# Patient Record
Sex: FEMALE | Race: WHITE
[De-identification: names, ages, dates, MRNs, and addresses within clinical notes are randomized per-mention and may not be internally consistent; named-entity substitution may affect disease eponyms.]

---

## 2018-03-11 ENCOUNTER — HOSPITAL ENCOUNTER (INPATIENT)
Dept: HOSPITAL 74 - JER | Age: 53
LOS: 7 days | Discharge: HOME | DRG: 189 | End: 2018-03-18
Attending: INTERNAL MEDICINE | Admitting: INTERNAL MEDICINE
Payer: COMMERCIAL

## 2018-03-11 VITALS — BODY MASS INDEX: 28 KG/M2

## 2018-03-11 DIAGNOSIS — J98.11: ICD-10-CM

## 2018-03-11 DIAGNOSIS — J18.9: ICD-10-CM

## 2018-03-11 DIAGNOSIS — K59.00: ICD-10-CM

## 2018-03-11 DIAGNOSIS — J96.01: Primary | ICD-10-CM

## 2018-03-11 DIAGNOSIS — J06.9: ICD-10-CM

## 2018-03-11 DIAGNOSIS — J45.901: ICD-10-CM

## 2018-03-11 LAB
ALBUMIN SERPL-MCNC: 3.7 G/DL (ref 3.4–5)
ALP SERPL-CCNC: 76 U/L (ref 45–117)
ALT SERPL-CCNC: 18 U/L (ref 12–78)
ANION GAP SERPL CALC-SCNC: 8 MMOL/L (ref 8–16)
AST SERPL-CCNC: 17 U/L (ref 15–37)
BASOPHILS # BLD: 0.5 % (ref 0–2)
BILIRUB SERPL-MCNC: 0.6 MG/DL (ref 0.2–1)
BUN SERPL-MCNC: 12 MG/DL (ref 7–18)
CALCIUM SERPL-MCNC: 8.7 MG/DL (ref 8.5–10.1)
CHLORIDE SERPL-SCNC: 107 MMOL/L (ref 98–107)
CO2 SERPL-SCNC: 29 MMOL/L (ref 21–32)
CREAT SERPL-MCNC: 0.9 MG/DL (ref 0.55–1.02)
DEPRECATED RDW RBC AUTO: 12.7 % (ref 11.6–15.6)
EOSINOPHIL # BLD: 3.9 % (ref 0–4.5)
GLUCOSE SERPL-MCNC: 64 MG/DL (ref 74–106)
HCT VFR BLD CALC: 41.9 % (ref 32.4–45.2)
HGB BLD-MCNC: 14 GM/DL (ref 10.7–15.3)
LYMPHOCYTES # BLD: 21.4 % (ref 8–40)
MCH RBC QN AUTO: 30.5 PG (ref 25.7–33.7)
MCHC RBC AUTO-ENTMCNC: 33.5 G/DL (ref 32–36)
MCV RBC: 91.2 FL (ref 80–96)
MONOCYTES # BLD AUTO: 6.8 % (ref 3.8–10.2)
NEUTROPHILS # BLD: 67.4 % (ref 42.8–82.8)
PH BLDV: 7.32 [PH] (ref 7.32–7.42)
PLATELET # BLD AUTO: 206 K/MM3 (ref 134–434)
PMV BLD: 9.5 FL (ref 7.5–11.1)
POTASSIUM SERPLBLD-SCNC: 3.7 MMOL/L (ref 3.5–5.1)
PROT SERPL-MCNC: 7 G/DL (ref 6.4–8.2)
RBC # BLD AUTO: 4.59 M/MM3 (ref 3.6–5.2)
SODIUM SERPL-SCNC: 144 MMOL/L (ref 136–145)
VENOUS PC02: 41.5 MMHG (ref 38–52)
VENOUS PO2: 48 MMHG (ref 28–48)
WBC # BLD AUTO: 10.2 K/MM3 (ref 4–10)

## 2018-03-11 NOTE — PDOC
History of Present Illness





<SpauldingElvia - Last Filed: 03/12/18 00:35>





- General


History Source: Patient





- History of Present Illness


Initial Comments: 





03/11/18 23:02


52 y.o. female with a PMH of asthma (no prior hospitalizations/intubations) 

presents to our ED c/o acute onset of dyspnea.  At presentation patient SpO2 80'

s @ presetation .  Patient's  @ bedside notes patient has c/o 2 days of 

myalgias, weakness, sore throat. Recent travel to [place]. Patient used her 

inhaler a few times this weekend and had one at home breathing treatment this 

evening @ 1800 prior to presentation.  Patient works as a  and 

many of the students have been treated for Influenza/influenza like symptoms.  





Patient denies any chest pain, abdominal pain, nausea/vomiting, diarrhea/

constipation.  





NKDA


Surgical: denies


Social: denies nicotine, social alcohol 1-2 drinks/monthly, denies recreational 

drugs


PMD: none;  Internist: Juanjose














<Cheryl Card - Last Filed: 03/16/18 15:54>





- General


Chief Complaint: Wheezing


Stated Complaint: SHORTNESS OF BREATH


Time Seen by Provider: 03/11/18 22:40





Past History





<ChellyElvia - Last Filed: 03/12/18 00:35>





- Past Medical History


Asthma: Yes


COPD: No





- Suicide/Smoking/Psychosocial Hx


Smoking History: Never smoked


Have you smoked in the past 12 months: No


Information on smoking cessation initiated: No


Hx Alcohol Use: No


Drug/Substance Use Hx: No


Substance Use Type: None





<Cheryl Card - Last Filed: 03/16/18 15:54>





- Past Medical History


Allergies/Adverse Reactions: 


 Allergies











Allergy/AdvReac Type Severity Reaction Status Date / Time


 


No Known Allergies Allergy   Verified 03/11/18 22:34











Home Medications: 


Ambulatory Orders





NK [No Known Home Medication]  03/11/18 











**Review of Systems





- Review of Systems


Constitutional: Yes: Malaise.  No: Chills, Fever


HEENTM: Yes: Throat Pain.  No: Recent change in vision


Respiratory: Yes: Shortness of Breath, Wheezing


Cardiac (ROS): No: Chest Pain, Lightheadedness, Palpitations, Syncope


ABD/GI: No: Constipated, Diarrhea, Nausea, Vomiting


: No: Burning, Dysuria


Neurological: No: Headache, Numbness





<Cheryl Card - Last Filed: 03/16/18 15:54>





*Physical Exam





- Vital Signs


 Last Vital Signs











Temp Pulse Resp BP Pulse Ox


 


 98.7 F   113 H  26 H  113/61   100 


 


 03/11/18 22:36  03/11/18 22:36  03/11/18 22:36  03/11/18 22:36  03/11/18 23:05














<Elvia Spaulding - Last Filed: 03/12/18 00:35>





- Vital Signs


 Last Vital Signs











Temp Pulse Resp BP Pulse Ox


 


 98.7 F   113 H  26 H  113/61   86 L


 


 03/11/18 22:36  03/11/18 22:36  03/11/18 22:36  03/11/18 22:36  03/11/18 22:36














- Physical Exam


General Appearance: Yes: Nourished, Appropriately Dressed


HEENT: positive: EOMI, SOHA


Neck: positive: Trachea midline, Supple


Respiratory/Chest: positive: Labored Respiration, Rapid RR, Wheezing.  negative

: Crackles, Rales


Cardiovascular: positive: S1, S2.  negative: Edema, JVD, Murmur


Gastrointestinal/Abdominal: positive: Normal Bowel Sounds, Soft


Musculoskeletal: negative: CVA Tenderness (R), CVA Tenderness (L)


Integumentary: positive: Normal Color, Dry, Warm


Neurologic: positive: Fully Oriented, Alert





<Cheryl Card - Last Filed: 03/16/18 15:54>





ED Treatment Course





- LABORATORY


CBC & Chemistry Diagram: 


 03/11/18 22:45





 03/11/18 22:45





- ADDITIONAL ORDERS


Additional order review: 


 Laboratory  Results











  03/11/18 03/11/18 03/11/18





  23:11 23:08 22:45


 


D-Dimer   1345 H 


 


VBG pH  7.32  


 


POC VBG pCO2  41.5  


 


POC VBG pO2  48.0  


 


Mixed VBG HCO3  21.0  


 


Sodium   


 


Potassium   


 


Chloride   


 


Carbon Dioxide   


 


Anion Gap   


 


BUN   


 


Creatinine   


 


Creat Clearance w eGFR   


 


Random Glucose   


 


Calcium   


 


Total Bilirubin   


 


AST   


 


ALT   


 


Alkaline Phosphatase   


 


Creatine Kinase    74


 


Troponin I    < 0.02


 


Total Protein   


 


Albumin   














  03/11/18





  22:45


 


D-Dimer 


 


VBG pH 


 


POC VBG pCO2 


 


POC VBG pO2 


 


Mixed VBG HCO3 


 


Sodium  144


 


Potassium  3.7


 


Chloride  107


 


Carbon Dioxide  29


 


Anion Gap  8


 


BUN  12


 


Creatinine  0.9


 


Creat Clearance w eGFR  > 60


 


Random Glucose  64 L


 


Calcium  8.7


 


Total Bilirubin  0.6


 


AST  17


 


ALT  18


 


Alkaline Phosphatase  76


 


Creatine Kinase 


 


Troponin I 


 


Total Protein  7.0


 


Albumin  3.7








 











  03/11/18





  22:45


 


RBC  4.59


 


MCV  91.2


 


MCHC  33.5


 


RDW  12.7


 


MPV  9.5


 


Neutrophils %  67.4


 


Lymphocytes %  21.4


 


Monocytes %  6.8


 


Eosinophils %  3.9


 


Basophils %  0.5














- Medications


Given in the ED: 


ED Medications














Discontinued Medications














Generic Name Dose Route Start Last Admin





  Trade Name Edmondq  PRN Reason Stop Dose Admin


 


Albuterol/Ipratropium  1 amp  03/11/18 22:45  03/11/18 23:17





  Duoneb -  NEB  03/11/18 22:46  1 amp





  ONCE ONE   Administration


 


Aspirin  325 mg  03/11/18 23:47  03/12/18 00:01





  Asa -  PO  03/11/18 23:48  325 mg





  ONCE ONE   Administration


 


Sodium Chloride  1,000 ml  03/11/18 23:44  03/12/18 00:01





  Normal Saline -  IV  03/11/18 23:45  1,000 ml





  ONCE ONE   Administration














<Elvia Spaulding - Last Filed: 03/12/18 00:35>





- LABORATORY


CBC & Chemistry Diagram: 


 03/16/18 07:00





 03/16/18 07:00





<Cherly Card - Last Filed: 03/16/18 15:54>





Medical Decision Making





- Medical Decision Making





03/11/18 23:10


52 y.o. female presents with acute onset of dyspnea -- DDx [place] exacerbation 

vs. PE vs. will obtain CXR; patient on BIPAP





03/11/18 23:46


EKG shows sinus tachycardia flipped T waves in inferior leads (obtained while 

patient receiving Duo Nebs). CXR shows no infiltrate/consolidation.  D-dimer (+)

.  Patient given Heparin bolus (80 mg/kg) + Heparin IV (18 mg/kg) while 

awaiting CTA.  ICU @ beside - does not recc tPA as patient hypotensive.  

Patient admitted to inpatient medicine for further evaluation of hypoxic 

respiratory failure.   Will continue to monitor while in ED.




















<Cheryl Card - Last Filed: 03/16/18 15:54>





*DC/Admit/Observation/Transfer





<Elvia Spaulding - Last Filed: 03/12/18 00:35>





<Cheryl Card - Last Filed: 03/16/18 15:54>


Diagnosis at time of Disposition: 


 Pulmonary embolism

## 2018-03-12 LAB
ALBUMIN SERPL-MCNC: 3.6 G/DL (ref 3.4–5)
ALP SERPL-CCNC: 85 U/L (ref 45–117)
ALT SERPL-CCNC: 43 U/L (ref 12–78)
ANION GAP SERPL CALC-SCNC: 12 MMOL/L (ref 8–16)
APTT BLD: 28.9 SECONDS (ref 26.9–34.4)
APTT BLD: 85.3 SECONDS (ref 26.9–34.4)
ARTERIAL BLOOD GAS PCO2: 46.9 MMHG (ref 35–45)
ARTERIAL PATENCY WRIST A: POSITIVE
AST SERPL-CCNC: 64 U/L (ref 15–37)
BASE EXCESS BLDA CALC-SCNC: -3.2 MEQ/L (ref -2–2)
BASOPHILS # BLD: 0.1 % (ref 0–2)
BILIRUB SERPL-MCNC: 0.6 MG/DL (ref 0.2–1)
BUN SERPL-MCNC: 9 MG/DL (ref 7–18)
CALCIUM SERPL-MCNC: 8.4 MG/DL (ref 8.5–10.1)
CHLORIDE SERPL-SCNC: 107 MMOL/L (ref 98–107)
CO2 SERPL-SCNC: 24 MMOL/L (ref 21–32)
CREAT SERPL-MCNC: 0.9 MG/DL (ref 0.55–1.02)
DEPRECATED RDW RBC AUTO: 13.1 % (ref 11.6–15.6)
EOSINOPHIL # BLD: 0 % (ref 0–4.5)
GLUCOSE SERPL-MCNC: 178 MG/DL (ref 74–106)
HCT VFR BLD CALC: 42.8 % (ref 32.4–45.2)
HGB BLD-MCNC: 13.9 GM/DL (ref 10.7–15.3)
INR BLD: 0.93 (ref 0.82–1.09)
INR BLD: 1 (ref 0.82–1.09)
LYMPHOCYTES # BLD: 2.5 % (ref 8–40)
MAGNESIUM SERPL-MCNC: 2.7 MG/DL (ref 1.8–2.4)
MCH RBC QN AUTO: 30 PG (ref 25.7–33.7)
MCHC RBC AUTO-ENTMCNC: 32.4 G/DL (ref 32–36)
MCV RBC: 92.5 FL (ref 80–96)
MONOCYTES # BLD AUTO: 0.9 % (ref 3.8–10.2)
NEUTROPHILS # BLD: 96.5 % (ref 42.8–82.8)
PHOSPHATE SERPL-MCNC: 3 MG/DL (ref 2.5–4.9)
PLATELET # BLD AUTO: 205 K/MM3 (ref 134–434)
PMV BLD: 10.2 FL (ref 7.5–11.1)
PO2 BLDA: 110 MMHG (ref 80–100)
POTASSIUM SERPLBLD-SCNC: 4.4 MMOL/L (ref 3.5–5.1)
PROT SERPL-MCNC: 7.4 G/DL (ref 6.4–8.2)
PT PNL PPP: 10.5 SEC (ref 9.98–11.88)
PT PNL PPP: 11.3 SEC (ref 9.98–11.88)
RBC # BLD AUTO: 4.63 M/MM3 (ref 3.6–5.2)
SAO2 % BLDA: 97.9 % (ref 90–98.9)
SODIUM SERPL-SCNC: 143 MMOL/L (ref 136–145)
WBC # BLD AUTO: 11.6 K/MM3 (ref 4–10)

## 2018-03-12 RX ADMIN — DEXTROSE AND SODIUM CHLORIDE SCH MLS/HR: 5; 450 INJECTION, SOLUTION INTRAVENOUS at 03:14

## 2018-03-12 RX ADMIN — IPRATROPIUM BROMIDE AND ALBUTEROL SULFATE SCH AMP: .5; 3 SOLUTION RESPIRATORY (INHALATION) at 21:00

## 2018-03-12 RX ADMIN — METHYLPREDNISOLONE SODIUM SUCCINATE SCH MG: 125 INJECTION, POWDER, FOR SOLUTION INTRAMUSCULAR; INTRAVENOUS at 21:13

## 2018-03-12 RX ADMIN — METHYLPREDNISOLONE SODIUM SUCCINATE SCH MG: 125 INJECTION, POWDER, FOR SOLUTION INTRAMUSCULAR; INTRAVENOUS at 06:19

## 2018-03-12 RX ADMIN — AZITHROMYCIN DIHYDRATE SCH MLS/HR: 500 INJECTION, POWDER, LYOPHILIZED, FOR SOLUTION INTRAVENOUS at 21:11

## 2018-03-12 RX ADMIN — CEFTRIAXONE SCH MLS/HR: 1 INJECTION, SOLUTION INTRAVENOUS at 21:19

## 2018-03-12 RX ADMIN — CHLORHEXIDINE GLUCONATE SCH APPLIC: 213 SOLUTION TOPICAL at 21:18

## 2018-03-12 RX ADMIN — MUPIROCIN SCH APPLIC: 20 OINTMENT TOPICAL at 10:02

## 2018-03-12 RX ADMIN — METHYLPREDNISOLONE SODIUM SUCCINATE SCH MG: 125 INJECTION, POWDER, FOR SOLUTION INTRAMUSCULAR; INTRAVENOUS at 09:36

## 2018-03-12 RX ADMIN — OSELTAMIVIR PHOSPHATE SCH MG: 75 CAPSULE ORAL at 11:00

## 2018-03-12 RX ADMIN — IPRATROPIUM BROMIDE AND ALBUTEROL SULFATE SCH AMP: .5; 3 SOLUTION RESPIRATORY (INHALATION) at 16:58

## 2018-03-12 RX ADMIN — IPRATROPIUM BROMIDE AND ALBUTEROL SULFATE SCH AMP: .5; 3 SOLUTION RESPIRATORY (INHALATION) at 11:20

## 2018-03-12 RX ADMIN — MUPIROCIN SCH APPLIC: 20 OINTMENT TOPICAL at 21:21

## 2018-03-12 RX ADMIN — IPRATROPIUM BROMIDE AND ALBUTEROL SULFATE SCH AMP: .5; 3 SOLUTION RESPIRATORY (INHALATION) at 08:06

## 2018-03-12 RX ADMIN — OSELTAMIVIR PHOSPHATE SCH MG: 75 CAPSULE ORAL at 03:24

## 2018-03-12 RX ADMIN — AZITHROMYCIN DIHYDRATE SCH MLS/HR: 500 INJECTION, POWDER, LYOPHILIZED, FOR SOLUTION INTRAVENOUS at 03:24

## 2018-03-12 NOTE — EKG
Test Reason : 

Blood Pressure : ***/*** mmHG

Vent. Rate : 121 BPM     Atrial Rate : 121 BPM

   P-R Int : 142 ms          QRS Dur : 076 ms

    QT Int : 282 ms       P-R-T Axes : 072 057 -36 degrees

   QTc Int : 400 ms

 

SINUS TACHYCARDIA

NONSPECIFIC T WAVE ABNORMALITY

ABNORMAL ECG

WHEN COMPARED WITH ECG OF 11-MAR-2018 22:05,

NONSPECIFIC T WAVE ABNORMALITY, WORSE IN ANTERIOR LEADS

Confirmed by YAYA EM MD ([id number]) on 3/12/2018 4:02:30 PM

 

Referred By:             Confirmed By:YAYA EM MD

## 2018-03-12 NOTE — PN
Physical Exam: 


SUBJECTIVE: Patient seen and examined





Pt failed trial of NC overnight, desatted to 85%, placed back on bipap. This am

, pt endorses pleuritic chest pain, a non-productive cough, and wheezing. She 

denies feeling of SOB, abdominal pain, n/v/d/c, and dysuria. 





OBJECTIVE:





 Vital Signs











 Period  Temp  Pulse  Resp  BP Sys/Herrera  Pulse Ox


 


 Last 24 Hr  98.6 F-98.9 F    19-26  113-135/61-81  











GENERAL: middle aged female, awake, alert, and fully oriented, in no acute 

distress, on bipap


HEENT: NC, AT


LUNGS: diffuse wheezing, no rales, no rhonchi


HEART: tachycardic, regular rhythm, no murmurs


ABDOMEN: Soft, nontender, nondistended, normoactive bowel sounds, no guarding, 

no 


rebound, no hepatosplenomegaly, no masses.


EXTREMITIES: 2+ pulses, warm, well-perfused, no edema. 


NEUROLOGICAL: Cranial nerves II through XII grossly intact. Normal speech, gait 

not 


observed.














 Laboratory Results - last 24 hr











  03/11/18 03/11/18 03/11/18





  22:45 22:45 22:45


 


WBC  10.2 H  


 


RBC  4.59  


 


Hgb  14.0  


 


Hct  41.9  


 


MCV  91.2  


 


MCH  30.5  


 


MCHC  33.5  


 


RDW  12.7  


 


Plt Count  206  


 


MPV  9.5  


 


Neutrophils %  67.4  


 


Lymphocytes %  21.4  


 


Monocytes %  6.8  


 


Eosinophils %  3.9  


 


Basophils %  0.5  


 


PT with INR   


 


INR   


 


PTT (Actin FS)   


 


D-Dimer   


 


Anticoagulation Therapy   


 


Puncture Site   


 


ABG pH   


 


ABG pCO2 at Pt Temp   


 


ABG pO2 at Pt Temp   


 


ABG HCO3   


 


ABG O2 Sat (Measured)   


 


ABG O2 Content   


 


ABG Base Excess   


 


Ankur Test   


 


VBG pH   


 


POC VBG pCO2   


 


POC VBG pO2   


 


Mixed VBG HCO3   


 


O2 Delivery Device   


 


Oxygen Flow Rate   


 


Vent Mode   


 


Vent Rate   


 


Mechanical Rate   


 


Pressure Support Vent   


 


Sodium   144 


 


Potassium   3.7 


 


Chloride   107 


 


Carbon Dioxide   29 


 


Anion Gap   8 


 


BUN   12 


 


Creatinine   0.9 


 


Creat Clearance w eGFR   > 60 


 


Random Glucose   64 L 


 


Lactic Acid   


 


Calcium   8.7 


 


Phosphorus   


 


Magnesium   


 


Total Bilirubin   0.6 


 


AST   17 


 


ALT   18 


 


Alkaline Phosphatase   76 


 


Creatine Kinase    74


 


Creatine Kinase Index   


 


CK-MB (CK-2)   


 


Troponin I    < 0.02


 


B-Natriuretic Peptide   


 


Total Protein   7.0 


 


Albumin   3.7 


 


Blood Type   


 


Antibody Screen   














  03/11/18 03/11/18 03/12/18





  23:08 23:11 00:15


 


WBC   


 


RBC   


 


Hgb   


 


Hct   


 


MCV   


 


MCH   


 


MCHC   


 


RDW   


 


Plt Count   


 


MPV   


 


Neutrophils %   


 


Lymphocytes %   


 


Monocytes %   


 


Eosinophils %   


 


Basophils %   


 


PT with INR    10.50


 


INR    0.93


 


PTT (Actin FS)    28.9


 


D-Dimer  1345 H  


 


Anticoagulation Therapy   


 


Puncture Site   


 


ABG pH   


 


ABG pCO2 at Pt Temp   


 


ABG pO2 at Pt Temp   


 


ABG HCO3   


 


ABG O2 Sat (Measured)   


 


ABG O2 Content   


 


ABG Base Excess   


 


Ankur Test   


 


VBG pH   7.32 


 


POC VBG pCO2   41.5 


 


POC VBG pO2   48.0 


 


Mixed VBG HCO3   21.0 


 


O2 Delivery Device   


 


Oxygen Flow Rate   


 


Vent Mode   


 


Vent Rate   


 


Mechanical Rate   


 


Pressure Support Vent   


 


Sodium   


 


Potassium   


 


Chloride   


 


Carbon Dioxide   


 


Anion Gap   


 


BUN   


 


Creatinine   


 


Creat Clearance w eGFR   


 


Random Glucose   


 


Lactic Acid   


 


Calcium   


 


Phosphorus   


 


Magnesium   


 


Total Bilirubin   


 


AST   


 


ALT   


 


Alkaline Phosphatase   


 


Creatine Kinase   


 


Creatine Kinase Index   


 


CK-MB (CK-2)   


 


Troponin I   


 


B-Natriuretic Peptide   


 


Total Protein   


 


Albumin   


 


Blood Type   


 


Antibody Screen   














  03/12/18 03/12/18 03/12/18





  00:15 00:15 00:31


 


WBC   


 


RBC   


 


Hgb   


 


Hct   


 


MCV   


 


MCH   


 


MCHC   


 


RDW   


 


Plt Count   


 


MPV   


 


Neutrophils %   


 


Lymphocytes %   


 


Monocytes %   


 


Eosinophils %   


 


Basophils %   


 


PT with INR   


 


INR   


 


PTT (Actin FS)   


 


D-Dimer   


 


Anticoagulation Therapy   


 


Puncture Site   


 


ABG pH   


 


ABG pCO2 at Pt Temp   


 


ABG pO2 at Pt Temp   


 


ABG HCO3   


 


ABG O2 Sat (Measured)   


 


ABG O2 Content   


 


ABG Base Excess   


 


Ankur Test   


 


VBG pH   


 


POC VBG pCO2   


 


POC VBG pO2   


 


Mixed VBG HCO3   


 


O2 Delivery Device   


 


Oxygen Flow Rate   


 


Vent Mode   


 


Vent Rate   


 


Mechanical Rate   


 


Pressure Support Vent   


 


Sodium  Cancelled  


 


Potassium  Cancelled  


 


Chloride  Cancelled  


 


Carbon Dioxide  Cancelled  


 


Anion Gap  Cancelled  


 


BUN  Cancelled  


 


Creatinine  Cancelled  


 


Creat Clearance w eGFR  Cancelled  


 


Random Glucose  Cancelled  


 


Lactic Acid   1.4 


 


Calcium  Cancelled  


 


Phosphorus   


 


Magnesium   


 


Total Bilirubin  Cancelled  


 


AST  Cancelled  


 


ALT  Cancelled  


 


Alkaline Phosphatase  Cancelled  


 


Creatine Kinase   


 


Creatine Kinase Index   


 


CK-MB (CK-2)   


 


Troponin I   


 


B-Natriuretic Peptide   


 


Total Protein  Cancelled  


 


Albumin  Cancelled  


 


Blood Type    O POSITIVE


 


Antibody Screen    Negative














  03/12/18 03/12/18 03/12/18





  01:15 01:15 05:20


 


WBC    11.6 H


 


RBC    4.63


 


Hgb    13.9


 


Hct    42.8


 


MCV    92.5


 


MCH    30.0


 


MCHC    32.4


 


RDW    13.1


 


Plt Count    205


 


MPV    10.2


 


Neutrophils %    96.5 H


 


Lymphocytes %    2.5 L D


 


Monocytes %    0.9 L D


 


Eosinophils %    0.0  D


 


Basophils %    0.1


 


PT with INR   


 


INR   


 


PTT (Actin FS)   


 


D-Dimer   


 


Anticoagulation Therapy   


 


Puncture Site   


 


ABG pH   


 


ABG pCO2 at Pt Temp   


 


ABG pO2 at Pt Temp   


 


ABG HCO3   


 


ABG O2 Sat (Measured)   


 


ABG O2 Content   


 


ABG Base Excess   


 


Ankur Test   


 


VBG pH   


 


POC VBG pCO2   


 


POC VBG pO2   


 


Mixed VBG HCO3   


 


O2 Delivery Device   


 


Oxygen Flow Rate   


 


Vent Mode   


 


Vent Rate   


 


Mechanical Rate   


 


Pressure Support Vent   


 


Sodium   Cancelled 


 


Potassium   Cancelled 


 


Chloride   Cancelled 


 


Carbon Dioxide   Cancelled 


 


Anion Gap   Cancelled 


 


BUN   Cancelled 


 


Creatinine   Cancelled 


 


Creat Clearance w eGFR   Cancelled 


 


Random Glucose   Cancelled 


 


Lactic Acid   


 


Calcium   Cancelled 


 


Phosphorus   


 


Magnesium   


 


Total Bilirubin   Cancelled 


 


AST   Cancelled 


 


ALT   Cancelled 


 


Alkaline Phosphatase   Cancelled 


 


Creatine Kinase   


 


Creatine Kinase Index   


 


CK-MB (CK-2)   


 


Troponin I   


 


B-Natriuretic Peptide  Cancelled  


 


Total Protein   Cancelled 


 


Albumin   Cancelled 


 


Blood Type   


 


Antibody Screen   














  03/12/18 03/12/18 03/12/18





  05:20 05:20 05:32


 


WBC   


 


RBC   


 


Hgb   


 


Hct   


 


MCV   


 


MCH   


 


MCHC   


 


RDW   


 


Plt Count   


 


MPV   


 


Neutrophils %   


 


Lymphocytes %   


 


Monocytes %   


 


Eosinophils %   


 


Basophils %   


 


PT with INR  11.30  


 


INR  1.00  


 


PTT (Actin FS)  85.3 H D  


 


D-Dimer   


 


Anticoagulation Therapy   


 


Puncture Site   


 


ABG pH   


 


ABG pCO2 at Pt Temp   


 


ABG pO2 at Pt Temp   


 


ABG HCO3   


 


ABG O2 Sat (Measured)   


 


ABG O2 Content   


 


ABG Base Excess   


 


Ankur Test   


 


VBG pH   


 


POC VBG pCO2   


 


POC VBG pO2   


 


Mixed VBG HCO3   


 


O2 Delivery Device   


 


Oxygen Flow Rate   


 


Vent Mode   


 


Vent Rate   


 


Mechanical Rate   


 


Pressure Support Vent   


 


Sodium   143 


 


Potassium   4.4 


 


Chloride   107 


 


Carbon Dioxide   24 


 


Anion Gap   12 


 


BUN   9 


 


Creatinine   0.9 


 


Creat Clearance w eGFR   > 60 


 


Random Glucose   178 H 


 


Lactic Acid   


 


Calcium   8.4 L 


 


Phosphorus   3.0 


 


Magnesium   2.7 H 


 


Total Bilirubin   0.6 


 


AST   64 H 


 


ALT   43 


 


Alkaline Phosphatase   85 


 


Creatine Kinase   351 H 


 


Creatine Kinase Index   1.0 


 


CK-MB (CK-2)   3.817 H 


 


Troponin I   0.08 H 


 


B-Natriuretic Peptide   


 


Total Protein   7.4 


 


Albumin   3.6 


 


Blood Type    O POSITIVE


 


Antibody Screen    Negative














  03/12/18





  06:38


 


WBC 


 


RBC 


 


Hgb 


 


Hct 


 


MCV 


 


MCH 


 


MCHC 


 


RDW 


 


Plt Count 


 


MPV 


 


Neutrophils % 


 


Lymphocytes % 


 


Monocytes % 


 


Eosinophils % 


 


Basophils % 


 


PT with INR 


 


INR 


 


PTT (Actin FS) 


 


D-Dimer 


 


Anticoagulation Therapy  No Result Required.


 


Puncture Site  Right radial


 


ABG pH  7.31 L


 


ABG pCO2 at Pt Temp  46.9 H


 


ABG pO2 at Pt Temp  110.0 H


 


ABG HCO3  22.8


 


ABG O2 Sat (Measured)  97.9


 


ABG O2 Content  19.2


 


ABG Base Excess  -3.2 L


 


Ankur Test  Positive


 


VBG pH 


 


POC VBG pCO2 


 


POC VBG pO2 


 


Mixed VBG HCO3 


 


O2 Delivery Device  Bipap


 


Oxygen Flow Rate  100%


 


Vent Mode  S/t


 


Vent Rate  14


 


Mechanical Rate  No Result Required.


 


Pressure Support Vent  10/5


 


Sodium 


 


Potassium 


 


Chloride 


 


Carbon Dioxide 


 


Anion Gap 


 


BUN 


 


Creatinine 


 


Creat Clearance w eGFR 


 


Random Glucose 


 


Lactic Acid 


 


Calcium 


 


Phosphorus 


 


Magnesium 


 


Total Bilirubin 


 


AST 


 


ALT 


 


Alkaline Phosphatase 


 


Creatine Kinase 


 


Creatine Kinase Index 


 


CK-MB (CK-2) 


 


Troponin I 


 


B-Natriuretic Peptide 


 


Total Protein 


 


Albumin 


 


Blood Type 


 


Antibody Screen 








Active Medications











Generic Name Dose Route Start Last Admin





  Trade Name Freq  PRN Reason Stop Dose Admin


 


Acetaminophen  1,000 mg  03/12/18 02:51  03/12/18 03:14





  Ofirmev Injection -  IVPB   1,000 mg





  Q6H PRN   Administration





  PAIN LEVEL 6-10   


 


Albuterol Sulfate  1 amp  03/12/18 12:31  





  Ventolin 0.083% Nebulizer Soln -  NEB   





  Q4H PRN   





  SHORT OF BREATH/WHEEZING   


 


Albuterol/Ipratropium  1 amp  03/12/18 08:00  03/12/18 08:06





  Duoneb -  NEB   1 amp





  Q4H SHUBHAM   Administration


 


Chlorhexidine Gluconate  1 applic  03/12/18 22:00  





  Hibiclens For Decolonization -  TP   





  HS SHUBHAM   


 


Enoxaparin Sodium  40 mg  03/13/18 10:00  





  Lovenox -  SQ   





  DAILY SHUBHAM   


 


Dextrose/Sodium Chloride  1,000 mls @ 42 mls/hr  03/12/18 00:45  03/12/18 03:14





  D5-1/2ns -  IV   42 mls/hr





  ASDIR SHUBHAM   Administration


 


CEFTRIAXONE 1 G/50 ML PREMIX  50 mls @ 100 mls/hr  03/12/18 02:30  03/12/18 03:

15





  Ceftriaxone 1 Gm-D5w Bag  IVPB   100 mls/hr





  DAILY SHUBHAM   Administration


 


Azithromycin 500 mg/ Dextrose  250 mls @ 250 mls/hr  03/12/18 02:30  03/12/18 03

:24





  IVPB   250 mls/hr





  DAILY SHUBHAM   Administration


 


Methylprednisolone Sodium Succinate  60 mg  03/12/18 06:00  03/12/18 09:36





  Solu-Medrol -  IVPB   60 mg





  BID SHUBHAM   Administration


 


Mupirocin  1 applic  03/12/18 10:00  





  Bactroban Ointment (For Decolonization) -  NS  03/17/18 09:59  





  BID SHUBHAM   








CXR: increased atelectatic changes in upper lobes





ASSESSMENT/PLAN:





52F w/ hx of asthma (no prior intubations) who presented with acute cough and 

SOB, admitted for acute hypoxic respiratory failure and acute asthma 

exacerbation. 





#Respiratory


   -acute hypoxic resp failure 2/2 PNA vs. asthma exacerbation


   -CTA chest showed SHANTANU collapse with mild LLL mucoid impaction. will likely 

require bronch. recommend f/u CXR in 6 weeks to assess resolution. 


   -continue ceftriaxone and azithromycin, day 2


   -continue solumedrol


   -continue duonebs standing and albuterol nebs PRN


   -tamiflu D/C'd per primary team as flu is of low suspicion


   -wean from bipap as tolerated


   -afebrile, leukocytosis of 11.6 (can be 2/2 steroids)


   -ABG shows respiratory acidosis


   -APAP for fever or pain





#Cardiac


   -tachycardia of 105-115, likely 2/2 SOB and pain


   -BPs wnl


   -mild troponinemia of 0.08, likely 2/2 demand ischemia


   -f/u repeat trop





#GI


   -mildly increased AST of 64


   -trend LFTs for now





#FEN/ppx


   -D5-1/2NS @ 42


   -electrolytes wnl


   -full liquids


   -no GI ppx 


   -lovenox





Case discussed with attending, Dr. Flores.





-Aristides Guo MD PGY1











Visit type





- Emergency Visit


Emergency Visit: Yes


ED Registration Date: 03/12/18


Care time: The patient presented to the Emergency Department on the above date 

and was hospitalized for further evaluation of their emergent condition.





- New Patient


This patient is new to me today: Yes


Date on this admission: 03/12/18





- Critical Care


Critical Care patient: Yes


Total Critical Care Time (in minutes): 36


Critical Care Statement: The care of this patient involved high complexity 

decision making to prevent further life threatening deterioration of the patient

's condition and/or to evaluate & treat vital organ system(s) failure or risk 

of failure.

## 2018-03-12 NOTE — EKG
Test Reason : 

Blood Pressure : ***/*** mmHG

Vent. Rate : 120 BPM     Atrial Rate : 120 BPM

   P-R Int : 130 ms          QRS Dur : 076 ms

    QT Int : 318 ms       P-R-T Axes : 076 081 -59 degrees

   QTc Int : 449 ms

 

SINUS TACHYCARDIA

T WAVE ABNORMALITY, CONSIDER INFERIOR ISCHEMIA

ABNORMAL ECG

NO PREVIOUS ECGS AVAILABLE

Confirmed by YAYA EM MD ([id number]) on 3/12/2018 4:02:54 PM

 

Referred By:             Confirmed By:YAYA EM MD

## 2018-03-12 NOTE — PDOC
Attending Attestation





- Resident


Resident Name: Cheryl Card





- ED Attending Attestation


I have performed the following: I have examined & evaluated the patient, The 

case was reviewed & discussed with the resident, I agree w/resident's findings 

& plan





- HPI


HPI: 





03/12/18 00:36


Pt comes with wheezing and SOB that began suddenly today.  Pt is a school 

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 SHe has ill contacts and family thinks that this 

is asthma exacerbation due to the "viral illness"


[place] to [place]


Returned on Feb 26th.


Pt received duoneb at home.


She then received 2 duonebs in the ER. Placed on BiPAP.


Pt began having increased difficulty breathing; however, CXR is normal in the 

ER (portable view) EKG also sinus tachy with inferior flipped Ts.


Pt was taken off BiPAP and given 2 duonebs more and a recemic eoi inhaler. She 

recieved asa; also solumedrol and then 2g Mag sulfate.


SHe was replaced on the BiPAP.


DDimer came back at 1300+; heparin Bolus 80mg/kg given ; heparin IV drip 18mg/

kg running.


She was having increased SOB and wheeze, and 2 inline Biapap duonebs given.





ICU is at bedside evaluating patient for potential tPA administration.  ICU NP 

feels that pt is not hypotensive thus likely doesn;t warrant tPA at this time.


Pt will be admitted to ICU under the hospitalist, as her PMD retired and her 

pulmonologist is in [place].











- Physicial Exam


PE: 





03/17/18 22:19


Agree with resident exam


03/17/18 22:20


Pt has no pitting edema of her legs.  She has wheezing bilaterally and 

throughout. 


Pt has been getting tighter and tighter despite treatments and BiPAP and mag 

sulfate and racemic epinephrine.








- Medical Decision Making





03/12/18 03:02


Patient Name: KIMI PARIKH


THIS IS A PRELIMINARY REPORT FROM IMAGING ON CALL


DATE OF SERVICE: 2018-03-12 01:27:14


IMAGES: [id number]


EXAM: CTA chest


HISTORY: Possible PE


COMPARISON: None.


FINDINGS: There is no PE or dissection. Heart size is normal. There is no 

pleural or pericardial


effusion. Left upper lobe collapse is noted. There is no obvious obstructing 

hilar mass or bronchial


lesion. Mild left lower lobe mucoid impaction is noted. The rest of the lungs 

are clear.. No fractures


identified. The upper abdominal structures are normal.


IMPRESSION: Left upper lobe collapse without obvious hilar mass or 

endobronchial lesion, but


consider follow up with bronchoscopy.


Mild left lower lobe mucoid impaction.


03/17/18 22:21


Pt admitted to the ICU

## 2018-03-12 NOTE — MSN
Progress Note (SOAP)





- Subjective


Chief Complaint: 





Asthma Exacerbation


History of Present Illness: 





Patient is a 51 y/o female with past medical history of asthma with no previous 

admissions or intubations presented to the ED with a 4 day history of cough 

with white sputum and 1 day history of worsening shortness of breath. Patient 

found that recuse inhaler was not relieving her SOB. Patient also indicates 

that she has pleuritic chest pain when coughing. Coughing also causes headache. 

Pt denies and fever, chills, nausea or vomiting. She also denies any myalgias. 

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 Pt also 

recently traveled to [place] in February but does not recall any sick contacts 

of insect bites. In the ED CTA of chest was done and showed no PE or dissection 

and no effusion. CTA did show SHANTANU collapse. She was given heparin, terbutaline, 

duonebs, racemic epinephrine and tylenol IV.





- Current Medications


Current Medications: 


Active Medications





Acetaminophen (Ofirmev Injection -)  1,000 mg IVPB Q6H PRN


   PRN Reason: PAIN LEVEL 6-10


   Last Admin: 03/12/18 03:14 Dose:  1,000 mg


Albuterol/Ipratropium (Duoneb -)  1 amp NEB Q4H [facility]


Chlorhexidine Gluconate (Hibiclens For Decolonization -)  1 applic TP HS [facility]


Dextrose/Sodium Chloride (D5-1/2ns -)  1,000 mls @ 42 mls/hr IV ASDIR [facility]


   Last Admin: 03/12/18 03:14 Dose:  42 mls/hr


CEFTRIAXONE 1 G/50 ML PREMIX (Ceftriaxone 1 Gm-D5w Bag)  50 mls @ 100 mls/hr 

IVPB DAILY [facility]


   Last Admin: 03/12/18 03:15 Dose:  100 mls/hr


Azithromycin 500 mg/ Dextrose  250 mls @ 250 mls/hr IVPB DAILY [facility]


   Last Admin: 03/12/18 03:24 Dose:  250 mls/hr


Methylprednisolone Sodium Succinate (Solu-Medrol -)  60 mg IVPB BID [facility]


   Last Admin: 03/12/18 06:19 Dose:  60 mg


Mupirocin (Bactroban Ointment (For Decolonization) -)  1 applic NS BID [facility]


   Stop: 03/17/18 09:59


Oseltamivir Phosphate (Tamiflu -)  75 mg PO BID [facility]


   Stop: 03/17/18 01:59


   Last Admin: 03/12/18 03:24 Dose:  75 mg


Pneumococcal 13-Valent Conj Vacc (Prevnar 13 Syringe -)  0.5 ml IM .ONCE ONE


   Stop: 03/12/18 10:01











- Objective


Vital Signs: 


 Vital Signs











Temperature  98.9 F   03/12/18 06:00


 


Pulse Rate  115 H  03/12/18 06:00


 


Respiratory Rate  24   03/12/18 06:00


 


Blood Pressure  121/80   03/12/18 06:00


 


O2 Sat by Pulse Oximetry (%)  96   03/12/18 04:45











Constitutional: Yes: Well Nourished, No Distress, Calm


Eyes: Yes: Conjunctiva Clear, EOM Intact


HENT: Yes: Atraumatic, Normocephalic


Cardiovascular: Yes: Regular Rate and Rhythm


Respiratory: Yes: Wheezes


Gastrointestinal: Yes: Normal Bowel Sounds, Soft


Peripheral Pulses WNL: Yes


Peripheral Pulses: Left Radial: 2+, Right Radial: 2+, Left Doralis Pedis: 2+, 

Right Dorsalis Pedis: 2+


Edema: No


Neurological: Yes: Alert, Oriented





Labs


Lab Results: 





 Laboratory Results - last 24 hr











  03/11/18 03/11/18 03/11/18





  22:45 22:45 22:45


 


WBC  10.2 H  


 


RBC  4.59  


 


Hgb  14.0  


 


Hct  41.9  


 


MCV  91.2  


 


MCH  30.5  


 


MCHC  33.5  


 


RDW  12.7  


 


Plt Count  206  


 


MPV  9.5  


 


Neutrophils %  67.4  


 


Lymphocytes %  21.4  


 


Monocytes %  6.8  


 


Eosinophils %  3.9  


 


Basophils %  0.5  


 


PT with INR   


 


INR   


 


PTT (Actin FS)   


 


D-Dimer   


 


Anticoagulation Therapy   


 


Puncture Site   


 


ABG pH   


 


ABG pCO2 at Pt Temp   


 


ABG pO2 at Pt Temp   


 


ABG HCO3   


 


ABG O2 Sat (Measured)   


 


ABG O2 Content   


 


ABG Base Excess   


 


Ankur Test   


 


VBG pH   


 


POC VBG pCO2   


 


POC VBG pO2   


 


Mixed VBG HCO3   


 


O2 Delivery Device   


 


Oxygen Flow Rate   


 


Vent Mode   


 


Vent Rate   


 


Mechanical Rate   


 


Pressure Support Vent   


 


Sodium   144 


 


Potassium   3.7 


 


Chloride   107 


 


Carbon Dioxide   29 


 


Anion Gap   8 


 


BUN   12 


 


Creatinine   0.9 


 


Creat Clearance w eGFR   > 60 


 


Random Glucose   64 L 


 


Lactic Acid   


 


Calcium   8.7 


 


Phosphorus   


 


Magnesium   


 


Total Bilirubin   0.6 


 


AST   17 


 


ALT   18 


 


Alkaline Phosphatase   76 


 


Creatine Kinase    74


 


Troponin I    < 0.02


 


B-Natriuretic Peptide   


 


Total Protein   7.0 


 


Albumin   3.7 


 


Blood Type   


 


Antibody Screen   














  03/11/18 03/11/18 03/12/18





  23:08 23:11 00:15


 


WBC   


 


RBC   


 


Hgb   


 


Hct   


 


MCV   


 


MCH   


 


MCHC   


 


RDW   


 


Plt Count   


 


MPV   


 


Neutrophils %   


 


Lymphocytes %   


 


Monocytes %   


 


Eosinophils %   


 


Basophils %   


 


PT with INR    10.50


 


INR    0.93


 


PTT (Actin FS)    28.9


 


D-Dimer  1345 H  


 


Anticoagulation Therapy   


 


Puncture Site   


 


ABG pH   


 


ABG pCO2 at Pt Temp   


 


ABG pO2 at Pt Temp   


 


ABG HCO3   


 


ABG O2 Sat (Measured)   


 


ABG O2 Content   


 


ABG Base Excess   


 


Ankur Test   


 


VBG pH   7.32 


 


POC VBG pCO2   41.5 


 


POC VBG pO2   48.0 


 


Mixed VBG HCO3   21.0 


 


O2 Delivery Device   


 


Oxygen Flow Rate   


 


Vent Mode   


 


Vent Rate   


 


Mechanical Rate   


 


Pressure Support Vent   


 


Sodium   


 


Potassium   


 


Chloride   


 


Carbon Dioxide   


 


Anion Gap   


 


BUN   


 


Creatinine   


 


Creat Clearance w eGFR   


 


Random Glucose   


 


Lactic Acid   


 


Calcium   


 


Phosphorus   


 


Magnesium   


 


Total Bilirubin   


 


AST   


 


ALT   


 


Alkaline Phosphatase   


 


Creatine Kinase   


 


Troponin I   


 


B-Natriuretic Peptide   


 


Total Protein   


 


Albumin   


 


Blood Type   


 


Antibody Screen   














  03/12/18 03/12/18 03/12/18





  00:15 00:15 00:31


 


WBC   


 


RBC   


 


Hgb   


 


Hct   


 


MCV   


 


MCH   


 


MCHC   


 


RDW   


 


Plt Count   


 


MPV   


 


Neutrophils %   


 


Lymphocytes %   


 


Monocytes %   


 


Eosinophils %   


 


Basophils %   


 


PT with INR   


 


INR   


 


PTT (Actin FS)   


 


D-Dimer   


 


Anticoagulation Therapy   


 


Puncture Site   


 


ABG pH   


 


ABG pCO2 at Pt Temp   


 


ABG pO2 at Pt Temp   


 


ABG HCO3   


 


ABG O2 Sat (Measured)   


 


ABG O2 Content   


 


ABG Base Excess   


 


Ankur Test   


 


VBG pH   


 


POC VBG pCO2   


 


POC VBG pO2   


 


Mixed VBG HCO3   


 


O2 Delivery Device   


 


Oxygen Flow Rate   


 


Vent Mode   


 


Vent Rate   


 


Mechanical Rate   


 


Pressure Support Vent   


 


Sodium  Cancelled  


 


Potassium  Cancelled  


 


Chloride  Cancelled  


 


Carbon Dioxide  Cancelled  


 


Anion Gap  Cancelled  


 


BUN  Cancelled  


 


Creatinine  Cancelled  


 


Creat Clearance w eGFR  Cancelled  


 


Random Glucose  Cancelled  


 


Lactic Acid   1.4 


 


Calcium  Cancelled  


 


Phosphorus   


 


Magnesium   


 


Total Bilirubin  Cancelled  


 


AST  Cancelled  


 


ALT  Cancelled  


 


Alkaline Phosphatase  Cancelled  


 


Creatine Kinase   


 


Troponin I   


 


B-Natriuretic Peptide   


 


Total Protein  Cancelled  


 


Albumin  Cancelled  


 


Blood Type    O POSITIVE


 


Antibody Screen    Negative














  03/12/18 03/12/18 03/12/18





  01:15 01:15 05:20


 


WBC    11.6 H


 


RBC    4.63


 


Hgb    13.9


 


Hct    42.8


 


MCV    92.5


 


MCH    30.0


 


MCHC    32.4


 


RDW    13.1


 


Plt Count    205


 


MPV    10.2


 


Neutrophils %    96.5 H


 


Lymphocytes %    2.5 L D


 


Monocytes %    0.9 L D


 


Eosinophils %    0.0  D


 


Basophils %    0.1


 


PT with INR   


 


INR   


 


PTT (Actin FS)   


 


D-Dimer   


 


Anticoagulation Therapy   


 


Puncture Site   


 


ABG pH   


 


ABG pCO2 at Pt Temp   


 


ABG pO2 at Pt Temp   


 


ABG HCO3   


 


ABG O2 Sat (Measured)   


 


ABG O2 Content   


 


ABG Base Excess   


 


Ankur Test   


 


VBG pH   


 


POC VBG pCO2   


 


POC VBG pO2   


 


Mixed VBG HCO3   


 


O2 Delivery Device   


 


Oxygen Flow Rate   


 


Vent Mode   


 


Vent Rate   


 


Mechanical Rate   


 


Pressure Support Vent   


 


Sodium   Cancelled 


 


Potassium   Cancelled 


 


Chloride   Cancelled 


 


Carbon Dioxide   Cancelled 


 


Anion Gap   Cancelled 


 


BUN   Cancelled 


 


Creatinine   Cancelled 


 


Creat Clearance w eGFR   Cancelled 


 


Random Glucose   Cancelled 


 


Lactic Acid   


 


Calcium   Cancelled 


 


Phosphorus   


 


Magnesium   


 


Total Bilirubin   Cancelled 


 


AST   Cancelled 


 


ALT   Cancelled 


 


Alkaline Phosphatase   Cancelled 


 


Creatine Kinase   


 


Troponin I   


 


B-Natriuretic Peptide  Cancelled  


 


Total Protein   Cancelled 


 


Albumin   Cancelled 


 


Blood Type   


 


Antibody Screen   














  03/12/18 03/12/18 03/12/18





  05:20 05:20 05:32


 


WBC   


 


RBC   


 


Hgb   


 


Hct   


 


MCV   


 


MCH   


 


MCHC   


 


RDW   


 


Plt Count   


 


MPV   


 


Neutrophils %   


 


Lymphocytes %   


 


Monocytes %   


 


Eosinophils %   


 


Basophils %   


 


PT with INR  11.30  


 


INR  1.00  


 


PTT (Actin FS)  85.3 H D  


 


D-Dimer   


 


Anticoagulation Therapy   


 


Puncture Site   


 


ABG pH   


 


ABG pCO2 at Pt Temp   


 


ABG pO2 at Pt Temp   


 


ABG HCO3   


 


ABG O2 Sat (Measured)   


 


ABG O2 Content   


 


ABG Base Excess   


 


Ankur Test   


 


VBG pH   


 


POC VBG pCO2   


 


POC VBG pO2   


 


Mixed VBG HCO3   


 


O2 Delivery Device   


 


Oxygen Flow Rate   


 


Vent Mode   


 


Vent Rate   


 


Mechanical Rate   


 


Pressure Support Vent   


 


Sodium   143 


 


Potassium   4.4 


 


Chloride   107 


 


Carbon Dioxide   24 


 


Anion Gap   12 


 


BUN   9 


 


Creatinine   0.9 


 


Creat Clearance w eGFR   > 60 


 


Random Glucose   178 H 


 


Lactic Acid   


 


Calcium   8.4 L 


 


Phosphorus   3.0 


 


Magnesium   2.7 H 


 


Total Bilirubin   0.6 


 


AST   64 H 


 


ALT   43 


 


Alkaline Phosphatase   85 


 


Creatine Kinase   351 H 


 


Troponin I   0.08 H 


 


B-Natriuretic Peptide   


 


Total Protein   7.4 


 


Albumin   3.6 


 


Blood Type    O POSITIVE


 


Antibody Screen    Negative














  03/12/18





  06:38


 


WBC 


 


RBC 


 


Hgb 


 


Hct 


 


MCV 


 


MCH 


 


MCHC 


 


RDW 


 


Plt Count 


 


MPV 


 


Neutrophils % 


 


Lymphocytes % 


 


Monocytes % 


 


Eosinophils % 


 


Basophils % 


 


PT with INR 


 


INR 


 


PTT (Actin FS) 


 


D-Dimer 


 


Anticoagulation Therapy  No Result Required.


 


Puncture Site  Right radial


 


ABG pH  7.31 L


 


ABG pCO2 at Pt Temp  46.9 H


 


ABG pO2 at Pt Temp  110.0 H


 


ABG HCO3  22.8


 


ABG O2 Sat (Measured)  97.9


 


ABG O2 Content  19.2


 


ABG Base Excess  -3.2 L


 


Ankur Test  Positive


 


VBG pH 


 


POC VBG pCO2 


 


POC VBG pO2 


 


Mixed VBG HCO3 


 


O2 Delivery Device  Bipap


 


Oxygen Flow Rate  100%


 


Vent Mode  S/t


 


Vent Rate  14


 


Mechanical Rate  No Result Required.


 


Pressure Support Vent  10/5


 


Sodium 


 


Potassium 


 


Chloride 


 


Carbon Dioxide 


 


Anion Gap 


 


BUN 


 


Creatinine 


 


Creat Clearance w eGFR 


 


Random Glucose 


 


Lactic Acid 


 


Calcium 


 


Phosphorus 


 


Magnesium 


 


Total Bilirubin 


 


AST 


 


ALT 


 


Alkaline Phosphatase 


 


Creatine Kinase 


 


Troponin I 


 


B-Natriuretic Peptide 


 


Total Protein 


 


Albumin 


 


Blood Type 


 


Antibody Screen 








 Last Vital Signs











Temp Pulse Resp BP Pulse Ox


 


 98.9 F   92 H  24   121/80   99 


 


 03/12/18 06:00  03/12/18 08:07  03/12/18 06:00  03/12/18 06:00  03/12/18 08:07








Active Medications











Generic Name Dose Route Start Last Admin





  Trade Name Freq  PRN Reason Stop Dose Admin


 


Acetaminophen  1,000 mg  03/12/18 02:51  03/12/18 03:14





  Ofirmev Injection -  IVPB   1,000 mg





  Q6H PRN   Administration





  PAIN LEVEL 6-10   


 


Albuterol/Ipratropium  1 amp  03/12/18 08:00  03/12/18 08:06





  Duoneb -  NEB   1 amp





  Q4H SHUBHAM   Administration


 


Chlorhexidine Gluconate  1 applic  03/12/18 22:00  





  Hibiclens For Decolonization -  TP   





  HS SHUBHAM   


 


Dextrose/Sodium Chloride  1,000 mls @ 42 mls/hr  03/12/18 00:45  03/12/18 03:14





  D5-1/2ns -  IV   42 mls/hr





  ASDIR SHUBHAM   Administration


 


CEFTRIAXONE 1 G/50 ML PREMIX  50 mls @ 100 mls/hr  03/12/18 02:30  03/12/18 03:

15





  Ceftriaxone 1 Gm-D5w Bag  IVPB   100 mls/hr





  DAILY SHUBHAM   Administration


 


Azithromycin 500 mg/ Dextrose  250 mls @ 250 mls/hr  03/12/18 02:30  03/12/18 03

:24





  IVPB   250 mls/hr





  DAILY SHUBHAM   Administration


 


Methylprednisolone Sodium Succinate  60 mg  03/12/18 06:00  03/12/18 06:19





  Solu-Medrol -  IVPB   60 mg





  BID SHUBHAM   Administration


 


Mupirocin  1 applic  03/12/18 10:00  





  Bactroban Ointment (For Decolonization) -  NS  03/17/18 09:59  





  BID SHUBHAM   


 


Oseltamivir Phosphate  75 mg  03/12/18 02:00  03/12/18 03:24





  Tamiflu -  PO  03/17/18 01:59  75 mg





  BID SHUBHAM   Administration


 


Pneumococcal 13-Valent Conj Vacc  0.5 ml  03/12/18 10:00  





  Prevnar 13 Syringe -  IM  03/12/18 10:01  





  .ONCE ONE   














Assessment/Plan





1) Acute hypoxic Respiratory Failure


* Secondary to asthma exacerbation vs pneumonia vs influenza


* Currently on IV Solumedrol 60 mg Q12


* Duonebs Q4H


* Dr. Cheng Consulted


* Serial ABGs


2) Mucus Plug


* Seen on SHANTANU on CTA


* Possible Bronchoscopy


* Patient NPO


3) Influenza


* discontinue tamiflu due to low suspicion


4) Pneumonia


* Ceftriaxone/Azithromycin

## 2018-03-12 NOTE — HP
CHIEF COMPLAINT: Cough and SOB X4 days





PCP: 





HISTORY OF PRESENT ILLNESS: Pt is a 53 yo F with PMHx of asthma (no previous 

admissions/intubations), presenting with a 4 day hx of cough productive of 

whitish non bloody sputum. There is associated retrosternal 7/10 pleuritic 

chest pain, non radiating, not related to exertion. Pt acknowleges headache and 

myalgias but no hx of fever or chills. Pt developed worsening SOB breath today 

that was not relieved by her rescue inhaler. She usually uses the inhaler 1-2 

nights a week. 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 

received the flu vaccine. She has never received the pneumovax or the pertussis 

vaccine in recent times. she recently traveled to [place], as she does 3 every 

year, and spent 10 days before returning. She denies any sick contacts or 

insect bites during her trip. 


Pt had 2 episodes of NBNB vomiting, but no diarrhea, no dysuria, no change in 

diet.


Pt came into the ED severely dyspneic and tachycardic and was being worked up 

for a PE.





ER course was notable for:


(1) 98.7, OR-113, BP-113/61, RR-26


(2)WBC-10.2, glu-64


(3) trops< 0.02, D dimer-1345, VBG-7.32/41.5/48.0,Lactic acid-1.4, 


(4) Heparin gtt, terbutaline, NS, D50W, duonebs, Mg, racemic epi, tylenol iv, 

ASA, 


(5) CTA chest- No PE/dissection. No pleural/pericardial effusion. SHANTANU collapse 

is noted without obvious hilar mass/endobronchial lesion, but consider follow 

up bronchoscopy. Mild LLL mucoid impaction.





Recent Travel:


[place]- Feb 26





PAST MEDICAL HISTORY:


Asthma





PAST SURGICAL HISTORY:


Denies





Social History:


Smoking: Denies


Alcohol: Denies


Drugs: Denies





Family History:


Allergies





No Known Allergies Allergy (Verified 03/11/18 22:34)


Allergic to cats


 








HOME MEDICATIONS:


 Home Medications











 Medication  Instructions  Recorded


 


NK [No Known Home Medication]  03/11/18








REVIEW OF SYSTEMS


CONSTITUTIONAL: 


Absent:  fever, chills, diaphoresis, generalized weakness, malaise, loss of 

appetite, weight change


HEENT: 


Absent:  rhinorrhea, nasal congestion, throat pain, throat swelling, difficulty 

swallowing, mouth swelling, ear pain, eye pain, visual changes


CARDIOVASCULAR: 


Absent: chest pain, syncope, palpitations, irregular heart rate, lightheadedness

, peripheral edema


RESPIRATORY: 


Absent: cough, shortness of breath, dyspnea with exertion, orthopnea, wheezing, 

stridor, hemoptysis


GASTROINTESTINAL:


Absent: abdominal pain, abdominal distension, nausea, vomiting, diarrhea, 

constipation, melena, hematochezia


GENITOURINARY: 


Absent: dysuria, frequency, urgency, hesitancy, hematuria, flank pain, genital 

pain


MUSCULOSKELETAL: 


Absent: myalgia, arthralgia, joint swelling, back pain, neck pain


SKIN: 


Absent: rash, itching, pallor


HEMATOLOGIC/IMMUNOLOGIC: 


Absent: easy bleeding, easy bruising, lymphadenopathy, frequent infections


ENDOCRINE:


Absent: unexplained weight gain, unexplained weight loss, heat intolerance, 

cold intolerance


NEUROLOGIC: 


Absent: headache, focal weakness or paresthesias, dizziness, unsteady gait, 

seizure, mental status changes, bladder or bowel incontinence


PSYCHIATRIC: 


Absent: anxiety, depression, suicidal or homicidal ideation, hallucinations.








PHYSICAL EXAMINATION


 Vital Signs - 24 hr











  03/11/18 03/11/18 03/12/18





  22:36 23:05 01:49


 


Temperature 98.7 F  


 


Pulse Rate 113 H  


 


Respiratory 26 H  





Rate   


 


Blood Pressure 113/61  


 


O2 Sat by Pulse 86 L 100 100





Oximetry (%)   














  03/12/18





  02:42


 


Temperature 98.8 F


 


Pulse Rate 117 H


 


Respiratory 24





Rate 


 


Blood Pressure 126/79


 


O2 Sat by Pulse 100





Oximetry (%) 











GENERAL: Awake, alert, and fully oriented, in no acute distress. On BIPAP, 

speaks in complete sentences


HEAD: Normal with no signs of trauma.


EYES: Pupils equal, round and reactive to light, extraocular movements intact, 

sclera anicteric, conjunctiva clear. 


EARS, NOSE, THROAT: Ears normal, nares patent, oropharynx clear without 

exudates. Dry mucous membranes.


NECK: Normal range of motion, supple without lymphadenopathy, JVD, or masses.


LUNGS:L lung  wheezes/rhonchi,  No accessory muscle use.


HEART: Regular rate and rhythm, normal S1 and S2 without murmur, rub or gallop.


ABDOMEN: Soft, nontender, not distended, normoactive bowel sounds, no guarding, 

no rebound, no masses.  


MUSCULOSKELETAL: Normal range of motion at all joints. No bony deformities or 

tenderness. No CVA tenderness.


UPPER EXTREMITIES: 2+ pulses, warm, well-perfused. No cyanosis. No clubbing. No 

peripheral edema.


LOWER EXTREMITIES: 2+ pulses, warm, well-perfused. No calf tenderness. No 

peripheral edema. 


NEUROLOGICAL:  Cranial nerves II-XII intact. Normal speech. gait not observed.


PSYCHIATRIC: Cooperative. Good eye contact. Appropriate mood and affect.





 Laboratory Results - last 24 hr











  03/11/18 03/11/18 03/11/18





  22:45 22:45 22:45


 


WBC  10.2 H  


 


RBC  4.59  


 


Hgb  14.0  


 


Hct  41.9  


 


MCV  91.2  


 


MCH  30.5  


 


MCHC  33.5  


 


RDW  12.7  


 


Plt Count  206  


 


MPV  9.5  


 


Neutrophils %  67.4  


 


Lymphocytes %  21.4  


 


Monocytes %  6.8  


 


Eosinophils %  3.9  


 


Basophils %  0.5  


 


PT with INR   


 


INR   


 


PTT (Actin FS)   


 


D-Dimer   


 


VBG pH   


 


POC VBG pCO2   


 


POC VBG pO2   


 


Mixed VBG HCO3   


 


Sodium   144 


 


Potassium   3.7 


 


Chloride   107 


 


Carbon Dioxide   29 


 


Anion Gap   8 


 


BUN   12 


 


Creatinine   0.9 


 


Creat Clearance w eGFR   > 60 


 


Random Glucose   64 L 


 


Lactic Acid   


 


Calcium   8.7 


 


Total Bilirubin   0.6 


 


AST   17 


 


ALT   18 


 


Alkaline Phosphatase   76 


 


Creatine Kinase    74


 


Troponin I    < 0.02


 


B-Natriuretic Peptide   


 


Total Protein   7.0 


 


Albumin   3.7 


 


Blood Type   


 


Antibody Screen   














  03/11/18 03/11/18 03/12/18





  23:08 23:11 00:15


 


WBC   


 


RBC   


 


Hgb   


 


Hct   


 


MCV   


 


MCH   


 


MCHC   


 


RDW   


 


Plt Count   


 


MPV   


 


Neutrophils %   


 


Lymphocytes %   


 


Monocytes %   


 


Eosinophils %   


 


Basophils %   


 


PT with INR    10.50


 


INR    0.93


 


PTT (Actin FS)    28.9


 


D-Dimer  1345 H  


 


VBG pH   7.32 


 


POC VBG pCO2   41.5 


 


POC VBG pO2   48.0 


 


Mixed VBG HCO3   21.0 


 


Sodium   


 


Potassium   


 


Chloride   


 


Carbon Dioxide   


 


Anion Gap   


 


BUN   


 


Creatinine   


 


Creat Clearance w eGFR   


 


Random Glucose   


 


Lactic Acid   


 


Calcium   


 


Total Bilirubin   


 


AST   


 


ALT   


 


Alkaline Phosphatase   


 


Creatine Kinase   


 


Troponin I   


 


B-Natriuretic Peptide   


 


Total Protein   


 


Albumin   


 


Blood Type   


 


Antibody Screen   














  03/12/18 03/12/18 03/12/18





  00:15 00:15 00:31


 


WBC   


 


RBC   


 


Hgb   


 


Hct   


 


MCV   


 


MCH   


 


MCHC   


 


RDW   


 


Plt Count   


 


MPV   


 


Neutrophils %   


 


Lymphocytes %   


 


Monocytes %   


 


Eosinophils %   


 


Basophils %   


 


PT with INR   


 


INR   


 


PTT (Actin FS)   


 


D-Dimer   


 


VBG pH   


 


POC VBG pCO2   


 


POC VBG pO2   


 


Mixed VBG HCO3   


 


Sodium  Cancelled  


 


Potassium  Cancelled  


 


Chloride  Cancelled  


 


Carbon Dioxide  Cancelled  


 


Anion Gap  Cancelled  


 


BUN  Cancelled  


 


Creatinine  Cancelled  


 


Creat Clearance w eGFR  Cancelled  


 


Random Glucose  Cancelled  


 


Lactic Acid   1.4 


 


Calcium  Cancelled  


 


Total Bilirubin  Cancelled  


 


AST  Cancelled  


 


ALT  Cancelled  


 


Alkaline Phosphatase  Cancelled  


 


Creatine Kinase   


 


Troponin I   


 


B-Natriuretic Peptide   


 


Total Protein  Cancelled  


 


Albumin  Cancelled  


 


Blood Type    O POSITIVE


 


Antibody Screen    Negative














  03/12/18 03/12/18





  01:15 01:15


 


WBC  


 


RBC  


 


Hgb  


 


Hct  


 


MCV  


 


MCH  


 


MCHC  


 


RDW  


 


Plt Count  


 


MPV  


 


Neutrophils %  


 


Lymphocytes %  


 


Monocytes %  


 


Eosinophils %  


 


Basophils %  


 


PT with INR  


 


INR  


 


PTT (Actin FS)  


 


D-Dimer  


 


VBG pH  


 


POC VBG pCO2  


 


POC VBG pO2  


 


Mixed VBG HCO3  


 


Sodium   Cancelled


 


Potassium   Cancelled


 


Chloride   Cancelled


 


Carbon Dioxide   Cancelled


 


Anion Gap   Cancelled


 


BUN   Cancelled


 


Creatinine   Cancelled


 


Creat Clearance w eGFR   Cancelled


 


Random Glucose   Cancelled


 


Lactic Acid  


 


Calcium   Cancelled


 


Total Bilirubin   Cancelled


 


AST   Cancelled


 


ALT   Cancelled


 


Alkaline Phosphatase   Cancelled


 


Creatine Kinase  


 


Troponin I  


 


B-Natriuretic Peptide  Cancelled 


 


Total Protein   Cancelled


 


Albumin   Cancelled


 


Blood Type  


 


Antibody Screen  











CTA chest- No PE/dissection. No pleural/pericardial effusion. SHANTANU collapse is 

noted without obvious hilar mass/endobronchial lesion, but consider follow up 

bronchoscopy. Mild LLL mucoid impaction.





ASSESSMENT/PLAN:





Pt is a 53 yo F with PMHx of asthma (no previous admissions/intubations), 

presenting with a 4 day hx of cough, SOB and pleuritic chest pain.





Acute hypoxic respiratory failure:


   2/2 acute asthma exacerbation vs flu vs CAP


   Iv solumed 60mg Q12H


   Duonebs ih Q4H


   ABG


   CXR


   Critical care/pulm consult- Dr Cheng


   Peak flow meter when improved


   For bronchoscopy- mucus plug


   Tamiflu 75mg Q12 x 5 days


   Azithro


   Ceftriaxone- pleuritic chest pain


   iv tylenol


   BiPAP


   NPO- likely bronchoscopy


   iv fluids


   Heparin SQ


   ICU level care


   iv fluids


   Elevated D Dimer


   Stop heparin drip -ve CTA for PE





FEN:


   Iv fluids


   Monitor lytes and replete as needed


   NPO for likely bronchoscopy for mucus plug


PPx:


   Heparin SQ


   Hold for bronchoscopy





Dispo:


   ICU


   





   


   








Visit type





- Emergency Visit


Emergency Visit: Yes


ED Registration Date: 03/12/18


Care time: The patient presented to the Emergency Department on the above date 

and was hospitalized for further evaluation of their emergent condition.





- New Patient


This patient is new to me today: Yes


Date on this admission: 03/12/18





- Critical Care


Critical Care patient: Yes


Total Critical Care Time (in minutes): 40


Critical Care Statement: The care of this patient involved high complexity 

decision making to prevent further life threatening deterioration of the patient

's condition and/or to evaluate & treat vital organ system(s) failure or risk 

of failure.





Hospitalist Screening





- Colonoscopy Questionnaire


Colonoscopy Questionnaire: 





Colonoscopy Questionnaire








-   Patient:


50 - 75 years old and never had a screening colonoscopy: Yes


History of colon or rectal polyps, or CA: Unknown


History of IBD, Crohn's disease or UC: Unknown


History of abdominal radiation therapy as a child: Unknown





-   Relative:


1 with colon or rectal CA, or polyps at age 60 or younger: Yes


Colon or rectal CA diagnosed at age 45 or younger: Unknown


Multiple relatives with colon or rectal CA: Unknown





-   Outcome:


Screening Result: Positive Screen

## 2018-03-12 NOTE — PN
Physical Exam: 


SUBJECTIVE: Patient seen and examined by me this AM





- Did not tolerate switched to NC, became hypoxic to 85%. Placed back on Bipap 

overnight. Endorses improvement in breathing since yesterday. one week of 

productive cough with sputum prior to admission. 


- Still with nonproductive cough w/ concurrent CP. No fever/chills, N/V/C/D. No 

BM overnight. No family hx of cancer, no pt hx of tobacco use. 








OBJECTIVE:





 Vital Signs


 Intake & Output











 03/09/18 03/10/18 03/11/18 03/12/18





 22:59 22:59 23:59 23:59


 


Intake Total    688


 


Output Total    1750


 


Balance    -1062


 


Weight    81.363 kg

















 Period  Temp  Pulse  Resp  BP Sys/Herrera  Pulse Ox


 


 Last 24 Hr  98.7 F-98.9 F  106-117  19-26  113-126/61-80  











GENERAL: A&Ox3. NAD. On Bipap. 


HEAD: Normal with no signs of trauma.


EYES: PERRL, extraocular movements intact, sclera anicteric, conjunctiva clear. 

No ptosis. 


ENT: Ears normal, nares patent, oropharynx clear without exudates, moist mucous 

membranes.


NECK: Trachea midline, full range of motion, supple. 


LUNGS: Expiratory wheezing in upper lung fields. Poor air entry diffusely. No 

crackles or rhonchi. No accessory muscle use.  


HEART: Regular rate and rhythm, S1, S2 without murmur, rub or gallop.


ABDOMEN: Soft, nontender, nondistended, normoactive bowel sounds, no guarding, 

no rebound, no hepatosplenomegaly, no masses.


EXTREMITIES: 2+ DP/PT pulses, warm, well-perfused, no edema. 


NEUROLOGICAL: Cranial nerves II through XII grossly intact. Normal speech, gait 

not observed. 5/5 strength and preserved sensation to light touch in all 

extremities. 








 Laboratory Results - last 24 hr


 CBC, BMP





 03/12/18 05:20 





 03/12/18 05:20 














  03/11/18 03/11/18 03/11/18





  22:45 22:45 22:45


 


WBC  10.2 H  


 


RBC  4.59  


 


Hgb  14.0  


 


Hct  41.9  


 


MCV  91.2  


 


MCH  30.5  


 


MCHC  33.5  


 


RDW  12.7  


 


Plt Count  206  


 


MPV  9.5  


 


Neutrophils %  67.4  


 


Lymphocytes %  21.4  


 


Monocytes %  6.8  


 


Eosinophils %  3.9  


 


Basophils %  0.5  


 


PT with INR   


 


INR   


 


PTT (Actin FS)   


 


D-Dimer   


 


Anticoagulation Therapy   


 


Puncture Site   


 


ABG pH   


 


ABG pCO2 at Pt Temp   


 


ABG pO2 at Pt Temp   


 


ABG HCO3   


 


ABG O2 Sat (Measured)   


 


ABG O2 Content   


 


ABG Base Excess   


 


Ankur Test   


 


VBG pH   


 


POC VBG pCO2   


 


POC VBG pO2   


 


Mixed VBG HCO3   


 


O2 Delivery Device   


 


Oxygen Flow Rate   


 


Vent Mode   


 


Vent Rate   


 


Mechanical Rate   


 


Pressure Support Vent   


 


Sodium   144 


 


Potassium   3.7 


 


Chloride   107 


 


Carbon Dioxide   29 


 


Anion Gap   8 


 


BUN   12 


 


Creatinine   0.9 


 


Creat Clearance w eGFR   > 60 


 


Random Glucose   64 L 


 


Lactic Acid   


 


Calcium   8.7 


 


Total Bilirubin   0.6 


 


AST   17 


 


ALT   18 


 


Alkaline Phosphatase   76 


 


Creatine Kinase    74


 


Troponin I    < 0.02


 


B-Natriuretic Peptide   


 


Total Protein   7.0 


 


Albumin   3.7 


 


Blood Type   


 


Antibody Screen   














  03/11/18 03/11/18 03/12/18





  23:08 23:11 00:15


 


WBC   


 


RBC   


 


Hgb   


 


Hct   


 


MCV   


 


MCH   


 


MCHC   


 


RDW   


 


Plt Count   


 


MPV   


 


Neutrophils %   


 


Lymphocytes %   


 


Monocytes %   


 


Eosinophils %   


 


Basophils %   


 


PT with INR    10.50


 


INR    0.93


 


PTT (Actin FS)    28.9


 


D-Dimer  1345 H  


 


Anticoagulation Therapy   


 


Puncture Site   


 


ABG pH   


 


ABG pCO2 at Pt Temp   


 


ABG pO2 at Pt Temp   


 


ABG HCO3   


 


ABG O2 Sat (Measured)   


 


ABG O2 Content   


 


ABG Base Excess   


 


Ankur Test   


 


VBG pH   7.32 


 


POC VBG pCO2   41.5 


 


POC VBG pO2   48.0 


 


Mixed VBG HCO3   21.0 


 


O2 Delivery Device   


 


Oxygen Flow Rate   


 


Vent Mode   


 


Vent Rate   


 


Mechanical Rate   


 


Pressure Support Vent   


 


Sodium   


 


Potassium   


 


Chloride   


 


Carbon Dioxide   


 


Anion Gap   


 


BUN   


 


Creatinine   


 


Creat Clearance w eGFR   


 


Random Glucose   


 


Lactic Acid   


 


Calcium   


 


Total Bilirubin   


 


AST   


 


ALT   


 


Alkaline Phosphatase   


 


Creatine Kinase   


 


Troponin I   


 


B-Natriuretic Peptide   


 


Total Protein   


 


Albumin   


 


Blood Type   


 


Antibody Screen   














  03/12/18 03/12/18 03/12/18





  00:15 00:15 00:31


 


WBC   


 


RBC   


 


Hgb   


 


Hct   


 


MCV   


 


MCH   


 


MCHC   


 


RDW   


 


Plt Count   


 


MPV   


 


Neutrophils %   


 


Lymphocytes %   


 


Monocytes %   


 


Eosinophils %   


 


Basophils %   


 


PT with INR   


 


INR   


 


PTT (Actin FS)   


 


D-Dimer   


 


Anticoagulation Therapy   


 


Puncture Site   


 


ABG pH   


 


ABG pCO2 at Pt Temp   


 


ABG pO2 at Pt Temp   


 


ABG HCO3   


 


ABG O2 Sat (Measured)   


 


ABG O2 Content   


 


ABG Base Excess   


 


Ankur Test   


 


VBG pH   


 


POC VBG pCO2   


 


POC VBG pO2   


 


Mixed VBG HCO3   


 


O2 Delivery Device   


 


Oxygen Flow Rate   


 


Vent Mode   


 


Vent Rate   


 


Mechanical Rate   


 


Pressure Support Vent   


 


Sodium  Cancelled  


 


Potassium  Cancelled  


 


Chloride  Cancelled  


 


Carbon Dioxide  Cancelled  


 


Anion Gap  Cancelled  


 


BUN  Cancelled  


 


Creatinine  Cancelled  


 


Creat Clearance w eGFR  Cancelled  


 


Random Glucose  Cancelled  


 


Lactic Acid   1.4 


 


Calcium  Cancelled  


 


Total Bilirubin  Cancelled  


 


AST  Cancelled  


 


ALT  Cancelled  


 


Alkaline Phosphatase  Cancelled  


 


Creatine Kinase   


 


Troponin I   


 


B-Natriuretic Peptide   


 


Total Protein  Cancelled  


 


Albumin  Cancelled  


 


Blood Type    O POSITIVE


 


Antibody Screen    Negative














  03/12/18 03/12/18 03/12/18





  01:15 01:15 06:38


 


WBC   


 


RBC   


 


Hgb   


 


Hct   


 


MCV   


 


MCH   


 


MCHC   


 


RDW   


 


Plt Count   


 


MPV   


 


Neutrophils %   


 


Lymphocytes %   


 


Monocytes %   


 


Eosinophils %   


 


Basophils %   


 


PT with INR   


 


INR   


 


PTT (Actin FS)   


 


D-Dimer   


 


Anticoagulation Therapy    No Result Required.


 


Puncture Site    Right radial


 


ABG pH    7.31 L


 


ABG pCO2 at Pt Temp    46.9 H


 


ABG pO2 at Pt Temp    110.0 H


 


ABG HCO3    22.8


 


ABG O2 Sat (Measured)    97.9


 


ABG O2 Content    19.2


 


ABG Base Excess    -3.2 L


 


Ankur Test    Positive


 


VBG pH   


 


POC VBG pCO2   


 


POC VBG pO2   


 


Mixed VBG HCO3   


 


O2 Delivery Device    Bipap


 


Oxygen Flow Rate    100%


 


Vent Mode    S/t


 


Vent Rate    14


 


Mechanical Rate    No Result Required.


 


Pressure Support Vent    10/5


 


Sodium   Cancelled 


 


Potassium   Cancelled 


 


Chloride   Cancelled 


 


Carbon Dioxide   Cancelled 


 


Anion Gap   Cancelled 


 


BUN   Cancelled 


 


Creatinine   Cancelled 


 


Creat Clearance w eGFR   Cancelled 


 


Random Glucose   Cancelled 


 


Lactic Acid   


 


Calcium   Cancelled 


 


Total Bilirubin   Cancelled 


 


AST   Cancelled 


 


ALT   Cancelled 


 


Alkaline Phosphatase   Cancelled 


 


Creatine Kinase   


 


Troponin I   


 


B-Natriuretic Peptide  Cancelled  


 


Total Protein   Cancelled 


 


Albumin   Cancelled 


 


Blood Type   


 


Antibody Screen   








Active Medications











Generic Name Dose Route Start Last Admin





  Trade Name Tan  PRN Reason Stop Dose Admin


 


Acetaminophen  1,000 mg  03/12/18 02:51  03/12/18 03:14





  Ofirmev Injection -  IVPB   1,000 mg





  Q6H PRN   Administration





  PAIN LEVEL 6-10   


 


Albuterol/Ipratropium  1 amp  03/12/18 08:00  





  Duoneb -  NEB   





  Q4H SHUBHAM   


 


Chlorhexidine Gluconate  1 applic  03/12/18 22:00  





  Hibiclens For Decolonization -  TP   





  HS SHUBHAM   


 


Dextrose/Sodium Chloride  1,000 mls @ 42 mls/hr  03/12/18 00:45  03/12/18 03:14





  D5-1/2ns -  IV   42 mls/hr





  ASDIR SHUBHAM   Administration


 


CEFTRIAXONE 1 G/50 ML PREMIX  50 mls @ 100 mls/hr  03/12/18 02:30  03/12/18 03:

15





  Ceftriaxone 1 Gm-D5w Bag  IVPB   100 mls/hr





  DAILY SHUBHAM   Administration


 


Azithromycin 500 mg/ Dextrose  250 mls @ 250 mls/hr  03/12/18 02:30  03/12/18 03

:24





  IVPB   250 mls/hr





  DAILY SHUBHAM   Administration


 


Methylprednisolone Sodium Succinate  60 mg  03/12/18 06:00  03/12/18 06:19





  Solu-Medrol -  IVPB   60 mg





  BID SHUBHAM   Administration


 


Mupirocin  1 applic  03/12/18 10:00  





  Bactroban Ointment (For Decolonization) -  NS  03/17/18 09:59  





  BID SHUBHAM   


 


Oseltamivir Phosphate  75 mg  03/12/18 02:00  03/12/18 03:24





  Tamiflu -  PO  03/17/18 01:59  75 mg





  BID SHUBHAM   Administration


 


Pneumococcal 13-Valent Conj Vacc  0.5 ml  03/12/18 02:37  





  Prevnar 13 Syringe -  IM  03/12/18 02:38  





  .ONCE ONE   





No micro





CXR 3/11 - Impression: No acute pathology. 





CTA chest 3/12 - 1. No evidence of pulmonary embolism. 2. Left upper lobe 

consolidation/atelectasis. 3. Right middle lobe nodule for which short-term CT 

follow-up is now recommended. Please see above discussion.





ASSESSMENT/PLAN:


51 yo F with PMH of asthma (no prior admissions/intubations), who presented to 

ED w/ 4 day hx of cough, SOB and pleuritic chest pain. Now much improved on 

Bipap





Acute hypoxic respiratory failure 2/2 asthma exacerbation vs. PNA - CTA 

negative for PE/pneumothorax; notable for SHANTANU atelectasis/pna w/ possible 

mucous plug; borderline elevated WBC 11.6; Ddimer elevated at 1345; ABG notable 

for 7.31/47/110


-Iv solumed 60mg Q12H


- Duonebs prn


- Repeat CXR in AM


- peak flows


- possible bronchoscopy per ICU team


- d/c tamiflu


- chest PT w/ deep suctioning


- rocephin/azithro for CAP coverage


- trend WBC, fever curve


- BiPap for O2 support


- IVFs


- heparin gtt d/c'ed; ddimer elevated; likely APR


- IV tylenol for fever, pain control


- Pulm following





#PPx:


-lovenox





FEN


D51/2 NS 42 cc


lytes wnl


clear liquid diet





Plan discussed with attending, Dr. Tanya Bowers, PGY1





Visit type





- Emergency Visit


Emergency Visit: Yes


ED Registration Date: 03/12/18


Care time: The patient presented to the Emergency Department on the above date 

and was hospitalized for further evaluation of their emergent condition.





- New Patient


This patient is new to me today: Yes


Date on this admission: 03/13/18





- Critical Care


Critical Care patient: Yes


Total Critical Care Time (in minutes): 35


Critical Care Statement: The care of this patient involved high complexity 

decision making to prevent further life threatening deterioration of the patient

's condition and/or to evaluate & treat vital organ system(s) failure or risk 

of failure.

## 2018-03-12 NOTE — PN
Teaching Attending Note


Name of Resident: Damon Lee





ATTENDING PHYSICIAN STATEMENT





I saw and evaluated the patient.


I reviewed the resident's note and discussed the case with the resident.


I agree with the resident's findings and plan as documented.








SUBJECTIVE:


52 F with Pmhx. of Asthma (No prior hospitlizations/Intubations), who presents 

with acute shortness of breath. As per patient's family, She had been 

complaining of a two day history of sore throat, myalgias, and weakness.  As 

per family she did use her inhaler several times this weekend, last being prior 

to coming to hospital at 6PM. 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 No chest pain or pressure. Denies any 

fevers or chills.





OBJECTIVE:


Physical:


VS:


 Vital Signs











 Period  Temp  Pulse  Resp  BP Sys/Herrera  Pulse Ox


 


 Last 24 Hr  98.7 F  113  26  113/61  








GEN:On BIPAP, Able to speak full sentences


HEENT: NCAT, PERRL, BIPAP Mask on


CARD: S Tach S1, S2


RESP: Bilateral Coarse Expiratory Wheezing


ABD: BSx4, NTD to Palpation


EXT: - C/C/E





 CBCD











WBC  10.2 K/mm3 (4.0-10.0)  H  03/11/18  22:45    


 


RBC  4.59 M/mm3 (3.60-5.2)   03/11/18  22:45    


 


Hgb  14.0 GM/dL (10.7-15.3)   03/11/18  22:45    


 


Hct  41.9 % (32.4-45.2)   03/11/18  22:45    


 


MCV  91.2 fl (80-96)   03/11/18  22:45    


 


MCHC  33.5 g/dl (32.0-36.0)   03/11/18  22:45    


 


RDW  12.7 % (11.6-15.6)   03/11/18  22:45    


 


Plt Count  206 K/MM3 (134-434)   03/11/18  22:45    


 


MPV  9.5 fl (7.5-11.1)   03/11/18  22:45    








 CMP











Sodium  144 mmol/L (136-145)   03/11/18  22:45    


 


Potassium  3.7 mmol/L (3.5-5.1)   03/11/18  22:45    


 


Chloride  107 mmol/L ()   03/11/18  22:45    


 


Carbon Dioxide  29 mmol/L (21-32)   03/11/18  22:45    


 


Anion Gap  8  (8-16)   03/11/18  22:45    


 


BUN  12 mg/dL (7-18)   03/11/18  22:45    


 


Creatinine  0.9 mg/dL (0.55-1.02)   03/11/18  22:45    


 


Creat Clearance w eGFR  > 60  (>60)   03/11/18  22:45    


 


Random Glucose  64 mg/dL ()  L  03/11/18  22:45    


 


Calcium  8.7 mg/dL (8.5-10.1)   03/11/18  22:45    


 


Total Bilirubin  0.6 mg/dL (0.2-1.0)   03/11/18  22:45    


 


AST  17 U/L (15-37)   03/11/18  22:45    


 


ALT  18 U/L (12-78)   03/11/18  22:45    


 


Alkaline Phosphatase  76 U/L ()   03/11/18  22:45    


 


Total Protein  7.0 g/dl (6.4-8.2)   03/11/18  22:45    


 


Albumin  3.7 g/dl (3.4-5.0)   03/11/18  22:45    








 CARDIAC ENZYMES











Creatine Kinase  74 IU/L ()   03/11/18  22:45    


 


Troponin I  < 0.02 ng/ml (0.00-0.05)   03/11/18  22:45    








CTA: No PE, SHANTANU Collapse, Mild LLL mucoid impaction














ASSESSMENT AND PLAN:


52 F with pmhx. of asthma, being admitted for acute  hypoxic respiratory failure





1.) Acute Hypoxic Respiratory Failure


- NIPPV


- ABG


- CXR/ABG in AM


- NPO





2.) Acute Exacerbation of Asthma


- Duonebs Atc/Prn


- Solu-Medrol


- MG2+


- Peak Flow/Pulm





3.)  SHANTANU Collapse


- Pulm. Consult/Bronch


- ?Community Aquired Pnuemonia/? Possible Influenza


- Urine Ags


- Ceftriaxone/Azithro


- Cathleen-Flu 





4.) Dvt PPx


- Heparin 5000 q8





CC Time: 38 Minutes


Accepted to ICU

## 2018-03-12 NOTE — CONSULT
Consult - text type





- Consultation


Consultation Note: 





PULM/CCM





Pt seen and examined in ED





CC: shortness of breath





HPI: Briefly Ms Fragoso is a 53 y/o woman with hx of asthma who presents with a 

3 day hx of viral prodrome (URI, myalgias, malaise) and then tonight acute 

dyspnea which started at rest. There was no reported LOC, chest pain, N/V/D, 

trauma, new edema or calf pain. In ED pt was afebrile, normotensive, tachypneic 

with desaturations, tachycardic. On exam she was in resp distress, was placed 

on Bipap with some improvement. CXR was clear. Wheezing on exam. Given nebs, 

steroids, mag, terbutaline, asa and racemic epi. Ddimer +, there was concern 

for PE given flight from [place] at end of Feb. Started on heparin gtt. First 

troponin was negative. 


A bedside US showed robust LV function, normal RV.


CTA performed and pt transferred to ICU








Pmhx:


Asthma





PSx:


unk





Social;  of young children, no significant toxic habits, non 

smoker. marrried, children





 Vital Signs











Temp  98.7 F   03/11/18 22:36


 


Pulse  113 H  03/11/18 22:36


 


Resp  26 H  03/11/18 22:36


 


BP  113/61   03/11/18 22:36


 


Pulse Ox  100   03/11/18 23:05








 Intake & Output











 03/11/18 03/11/18 03/12/18





 11:59 23:59 11:59


 


Weight  78.471 kg 


 


Other:   


 


  Height  5 ft 7 in 


 


  Body Mass Index (BMI)  27.1 


 


  Weight Measurement Method  Est/Stated by Patient 














Ambulatory Orders





NK [No Known Home Medication]  03/11/18 





Active Medications





Heparin Sodium (Porcine) (Heparin -)  3,100 unit 40 unit/kg (3100 unit) IVPUSH 

PRN PRN


   PRN Reason: For aPTT 35 to 45 seconds


Heparin Sodium (Porcine) (Heparin -)  6,300 unit 80 unit/kg (6300 unit) IVPUSH 

PRN PRN


   PRN Reason: aPTT <35 seconds


Heparin Sodium/Dextrose (Heparin Infusion -)  25,000 units in 500 mls @ 28.25 

mls/hr IVPB TITR [facility]; 18 UNITS/KG/HR


   PRN Reason: Protocol


   Last Admin: 03/12/18 00:35 Dose:  18 units/kg/hr, 28.25 mls/hr


Dextrose/Sodium Chloride (D5-1/2ns -)  1,000 mls @ 42 mls/hr IV ASDIR [facility]





 CBCD











WBC  10.2 K/mm3 (4.0-10.0)  H  03/11/18  22:45    


 


RBC  4.59 M/mm3 (3.60-5.2)   03/11/18  22:45    


 


Hgb  14.0 GM/dL (10.7-15.3)   03/11/18  22:45    


 


Hct  41.9 % (32.4-45.2)   03/11/18  22:45    


 


MCV  91.2 fl (80-96)   03/11/18  22:45    


 


MCHC  33.5 g/dl (32.0-36.0)   03/11/18  22:45    


 


RDW  12.7 % (11.6-15.6)   03/11/18  22:45    


 


Plt Count  206 K/MM3 (134-434)   03/11/18  22:45    


 


MPV  9.5 fl (7.5-11.1)   03/11/18  22:45    








 CMP











Sodium  144 mmol/L (136-145)   03/11/18  22:45    


 


Potassium  3.7 mmol/L (3.5-5.1)   03/11/18  22:45    


 


Chloride  107 mmol/L ()   03/11/18  22:45    


 


Carbon Dioxide  29 mmol/L (21-32)   03/11/18  22:45    


 


Anion Gap  8  (8-16)   03/11/18  22:45    


 


BUN  12 mg/dL (7-18)   03/11/18  22:45    


 


Creatinine  0.9 mg/dL (0.55-1.02)   03/11/18  22:45    


 


Creat Clearance w eGFR  > 60  (>60)   03/11/18  22:45    


 


Random Glucose  64 mg/dL ()  L  03/11/18  22:45    


 


Calcium  8.7 mg/dL (8.5-10.1)   03/11/18  22:45    


 


Total Bilirubin  0.6 mg/dL (0.2-1.0)   03/11/18  22:45    


 


AST  17 U/L (15-37)   03/11/18  22:45    


 


ALT  18 U/L (12-78)   03/11/18  22:45    


 


Alkaline Phosphatase  76 U/L ()   03/11/18  22:45    


 


Total Protein  7.0 g/dl (6.4-8.2)   03/11/18  22:45    


 


Albumin  3.7 g/dl (3.4-5.0)   03/11/18  22:45    








 CARDIAC ENZYMES











Creatine Kinase  74 IU/L ()   03/11/18  22:45    


 


Troponin I  < 0.02 ng/ml (0.00-0.05)   03/11/18  22:45    











PE: 


Gen: non-toxic appearing eld woman, in moderate resp distress on NIV


HEENT: PERRL, NCAT, EOMI


CV: tachycardic, non-displaced PMI, no m/r/g appreciated


Pulm: diffuse wheezes bilaterally


ABD: soft, NT, ND


EXT: no homans, no edema, 


Neuro: awake alert non focal exam





EKG: ST, normal axis, TWI lateral, no qwave, no CHANELLE





CXR: no obvious infiltrates, no PTX





CTA: SHANTANU infiltrates, no obvious central or segmental PE. 





A/ 53 y/o woman with Hx of asthma now with asthma exacerbation in setting of URI





P/


-cont NIV, wean as tolerated


-nebs


-medrol


-viral swab if avail, empirc tamiflu if doesn't improve and swab n/a


-low threshold for CAP coverage if deteriorates


-repeat trop


-f/u CTA read, low threshold to hold heparin


-can get formal TTE if worsening hemodynamics/PE


-check strep and legionella


-can start diet in am


-icu monitoring





Vasile GALARZAP


1538


35CCT

## 2018-03-12 NOTE — PN
Teaching Attending Note


Name of Resident: Emeterio Bowers





ATTENDING PHYSICIAN STATEMENT





I saw and evaluated the patient.


I reviewed the resident's note and discussed the case with the resident.


I agree with the resident's findings and plan as documented.








SUBJECTIVE:states breathing improved on bipap. was having productive cough of 

white sputum for a week which dyspnea on mild exertion. some CP with cough. 

deneis fever, chills, myalgia, N/V/C/D


no tobacco use


no personal or family hx of cancer





OBJECTIVE:





 Last Vital Signs











Temp Pulse Resp BP Pulse Ox


 


 98.6 F   96 H  24   124/69   99 


 


 03/12/18 10:00  03/12/18 10:00  03/12/18 10:00  03/12/18 10:00 03/12/18 11:03








General NAD


CV S1 S2 tachy


Lungs mild expiratory wheezing, breath sounds heard over L apices


Abdomen soft NT/ND





ASSESSMENT AND PLAN:


53yo F wtih PMH asthma presenting with SOB and cough


1. Acute hypoxic respiratory failure- due to PNA and collapsed L upper lobe. 

saturaitng well on bipap. start deep suctioning, chest PT. will likely require 

bronch to remove mucous and evaluate for mass. cont Ceftriaxone/Azithromycin 

day 1, medrol IV.  will d/c tamiflu as low suspicion and alternative dx 

available. pulmonary on board. nebs prn


2. DVT ppx- will start lovenox as no known contraindication


3. MICU monitoring at this time due to tenuous respiratory status





The care of this patient involved high complexity decision making to prevent 

further life threatening deterioration of the patient's condition and/or to 

evaluate & treat vital organ system(s) failure or risk of failure. 38 minutes

## 2018-03-13 LAB
ALBUMIN SERPL-MCNC: 3.4 G/DL (ref 3.4–5)
ALP SERPL-CCNC: 71 U/L (ref 45–117)
ALT SERPL-CCNC: 31 U/L (ref 12–78)
ANION GAP SERPL CALC-SCNC: 10 MMOL/L (ref 8–16)
ARTERIAL BLOOD GAS PCO2: 49.7 MMHG (ref 35–45)
ARTERIAL PATENCY WRIST A: POSITIVE
AST SERPL-CCNC: 19 U/L (ref 15–37)
BASE EXCESS BLDA CALC-SCNC: -0.5 MEQ/L (ref -2–2)
BASOPHILS # BLD: 0.3 % (ref 0–2)
BILIRUB SERPL-MCNC: 1 MG/DL (ref 0.2–1)
BUN SERPL-MCNC: 9 MG/DL (ref 7–18)
CALCIUM SERPL-MCNC: 8.9 MG/DL (ref 8.5–10.1)
CHLORIDE SERPL-SCNC: 103 MMOL/L (ref 98–107)
CO2 SERPL-SCNC: 28 MMOL/L (ref 21–32)
CREAT SERPL-MCNC: 0.8 MG/DL (ref 0.55–1.02)
DEPRECATED RDW RBC AUTO: 13.1 % (ref 11.6–15.6)
EOSINOPHIL # BLD: 0.2 % (ref 0–4.5)
GLUCOSE SERPL-MCNC: 150 MG/DL (ref 74–106)
HCT VFR BLD CALC: 38.4 % (ref 32.4–45.2)
HGB BLD-MCNC: 12.8 GM/DL (ref 10.7–15.3)
LYMPHOCYTES # BLD: 3.2 % (ref 8–40)
MAGNESIUM SERPL-MCNC: 2.4 MG/DL (ref 1.8–2.4)
MCH RBC QN AUTO: 30.4 PG (ref 25.7–33.7)
MCHC RBC AUTO-ENTMCNC: 33.4 G/DL (ref 32–36)
MCV RBC: 91 FL (ref 80–96)
MONOCYTES # BLD AUTO: 2.2 % (ref 3.8–10.2)
NEUTROPHILS # BLD: 94.1 % (ref 42.8–82.8)
PHOSPHATE SERPL-MCNC: 3.3 MG/DL (ref 2.5–4.9)
PLATELET # BLD AUTO: 210 K/MM3 (ref 134–434)
PMV BLD: 10.1 FL (ref 7.5–11.1)
PO2 BLDA: 51 MMHG (ref 80–100)
POTASSIUM SERPLBLD-SCNC: 4.4 MMOL/L (ref 3.5–5.1)
PROT SERPL-MCNC: 6.6 G/DL (ref 6.4–8.2)
RBC # BLD AUTO: 4.22 M/MM3 (ref 3.6–5.2)
SAO2 % BLDA: 82.7 % (ref 90–98.9)
SODIUM SERPL-SCNC: 141 MMOL/L (ref 136–145)
WBC # BLD AUTO: 17.8 K/MM3 (ref 4–10)

## 2018-03-13 RX ADMIN — MUPIROCIN SCH APPLIC: 20 OINTMENT TOPICAL at 10:44

## 2018-03-13 RX ADMIN — IPRATROPIUM BROMIDE AND ALBUTEROL SULFATE SCH AMP: .5; 3 SOLUTION RESPIRATORY (INHALATION) at 15:47

## 2018-03-13 RX ADMIN — IPRATROPIUM BROMIDE AND ALBUTEROL SULFATE SCH AMP: .5; 3 SOLUTION RESPIRATORY (INHALATION) at 00:14

## 2018-03-13 RX ADMIN — CHLORHEXIDINE GLUCONATE SCH APPLIC: 213 SOLUTION TOPICAL at 22:35

## 2018-03-13 RX ADMIN — DEXTROSE AND SODIUM CHLORIDE SCH MLS/HR: 5; 450 INJECTION, SOLUTION INTRAVENOUS at 10:45

## 2018-03-13 RX ADMIN — IPRATROPIUM BROMIDE AND ALBUTEROL SULFATE SCH AMP: .5; 3 SOLUTION RESPIRATORY (INHALATION) at 04:08

## 2018-03-13 RX ADMIN — MUPIROCIN SCH APPLIC: 20 OINTMENT TOPICAL at 21:38

## 2018-03-13 RX ADMIN — ENOXAPARIN SODIUM SCH MG: 40 INJECTION SUBCUTANEOUS at 10:45

## 2018-03-13 RX ADMIN — IPRATROPIUM BROMIDE AND ALBUTEROL SULFATE SCH AMP: .5; 3 SOLUTION RESPIRATORY (INHALATION) at 11:51

## 2018-03-13 RX ADMIN — METHYLPREDNISOLONE SODIUM SUCCINATE SCH MG: 125 INJECTION, POWDER, FOR SOLUTION INTRAMUSCULAR; INTRAVENOUS at 10:45

## 2018-03-13 RX ADMIN — CEFTRIAXONE SCH MLS/HR: 1 INJECTION, SOLUTION INTRAVENOUS at 22:34

## 2018-03-13 RX ADMIN — IPRATROPIUM BROMIDE AND ALBUTEROL SULFATE SCH AMP: .5; 3 SOLUTION RESPIRATORY (INHALATION) at 07:50

## 2018-03-13 RX ADMIN — AZITHROMYCIN DIHYDRATE SCH MLS/HR: 500 INJECTION, POWDER, LYOPHILIZED, FOR SOLUTION INTRAVENOUS at 10:44

## 2018-03-13 RX ADMIN — IPRATROPIUM BROMIDE AND ALBUTEROL SULFATE SCH AMP: .5; 3 SOLUTION RESPIRATORY (INHALATION) at 20:50

## 2018-03-13 RX ADMIN — METHYLPREDNISOLONE SODIUM SUCCINATE SCH MG: 125 INJECTION, POWDER, FOR SOLUTION INTRAMUSCULAR; INTRAVENOUS at 22:34

## 2018-03-13 NOTE — MSN
Progress Note (SOAP)





- Subjective


Chief Complaint: 





Asthma Exacerbation


History of Present Illness: 





Patient is a 51 y/o female with past medical history of asthma with no previous 

admissions or intubations presented to the ED with a 4 day history of cough 

with white sputum and 1 day history of worsening shortness of breath. Patient 

found that recuse inhaler was not relieving her SOB. Patient also indicates 

that she has pleuritic chest pain when coughing. Coughing also causes headache. 

Pt denies and fever, chills, nausea or vomiting. She also denies any myalgias. 

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 Pt also 

recently traveled to [place] in February but does not recall any sick contacts 

of insect bites. In the ED CTA of chest was done and showed no PE or dissection 

and no effusion. CTA did show SHANTANU collapse. She was given heparin, terbutaline, 

duonebs, racemic epinephrine and tylenol IV.





Pt is a 51 y/o female with past medical history of asthma being treated for 

acute hypoxic respiratory failure. No acute overnight events. Patient reports 

feeling better. Coughing is still present but retrosternal chest pain due to 

coughing is reduced. Patient denies any nausea, vomiting, fever or chills.





- Current Medications


Current Medications: 


Active Medications





Acetaminophen (Ofirmev Injection -)  1,000 mg IVPB Q6H PRN


   PRN Reason: PAIN LEVEL 6-10


   Last Admin: 03/12/18 03:14 Dose:  1,000 mg


Albuterol Sulfate (Ventolin 0.083% Nebulizer Soln -)  1 amp NEB Q4H PRN


   PRN Reason: SHORT OF BREATH/WHEEZING


Albuterol/Ipratropium (Duoneb -)  1 amp NEB Q4H [facility]


   Last Admin: 03/13/18 04:08 Dose:  1 amp


Chlorhexidine Gluconate (Hibiclens For Decolonization -)  1 applic TP HS [facility]


   Last Admin: 03/12/18 21:18 Dose:  1 applic


Enoxaparin Sodium (Lovenox -)  40 mg SQ DAILY [facility]


Dextrose/Sodium Chloride (D5-1/2ns -)  1,000 mls @ 42 mls/hr IV ASDIR [facility]


   Last Admin: 03/12/18 03:14 Dose:  42 mls/hr


Azithromycin 500 mg/ Dextrose  250 mls @ 250 mls/hr IVPB DAILY [facility]


   Last Admin: 03/12/18 21:11 Dose:  250 mls/hr


CEFTRIAXONE 1 G/50 ML PREMIX (Ceftriaxone 1 Gm-D5w Bag)  50 mls @ 100 mls/hr 

IVPB DAILY@2200 [facility]


   Last Admin: 03/12/18 21:19 Dose:  100 mls/hr


Methylprednisolone Sodium Succinate (Solu-Medrol -)  60 mg IVPB BID [facility]


   Last Admin: 03/12/18 21:13 Dose:  60 mg


Mupirocin (Bactroban Ointment (For Decolonization) -)  1 applic NS BID [facility]


   Stop: 03/17/18 09:59


   Last Admin: 03/12/18 21:21 Dose:  1 applic











- Objective


Vital Signs: 


 Vital Signs











Temperature  98.3 F   03/13/18 06:00


 


Pulse Rate  85   03/13/18 06:00


 


Respiratory Rate  17   03/13/18 06:00


 


Blood Pressure  109/64   03/13/18 06:00


 


O2 Sat by Pulse Oximetry (%)  100   03/13/18 06:30











Constitutional: Yes: Well Nourished, No Distress, Calm


HENT: Yes: Atraumatic, Normocephalic


Cardiovascular: Yes: Regular Rate and Rhythm


Respiratory: Yes: Wheezes


Gastrointestinal: Yes: Normal Bowel Sounds, Soft


Peripheral Pulses WNL: Yes


Peripheral Pulses: Left Radial: 2+, Right Radial: 2+, Left Doralis Pedis: 2+, 

Right Dorsalis Pedis: 2+


Edema: No


Neurological: Yes: Alert, Oriented





Labs


Lab Results: 


 CBC, BMP





 03/13/18 05:30 





 03/13/18 05:30 











Assessment/Plan





1) Acute hypoxic Respiratory Failure


* Secondary to asthma exacerbation vs pneumonia vs influenza


* Currently on IV Solumedrol 60 mg Q12


* Duonebs Q4H


* Dr. Cheng Consulted


* Serial ABGs


* Tolerating trials off BiPap


2) Mucus Plug


* Seen on SHANTANU on CTA


* Possible Bronchoscopy


* Patient NPO


3) Influenza


* discontinue tamiflu due to low suspicion


4) Pneumonia


* Ceftriaxone/Azithromycin day 2

## 2018-03-13 NOTE — PN
Physical Exam: 


SUBJECTIVE: Patient seen and examined





Pt changed to NC at 6pm, tolerated it until 6:45pm and was placed back on 

bipap. Changed to venti-mask this am, tolerating well. Denies SOB, but endorses 

wheezing and increased productive cough of brown/green sputum. She denies 

lightheadedness, n/v/d, and LE pain.





OBJECTIVE:





 Vital Signs











 Period  Temp  Pulse  Resp  BP Sys/Herrera  Pulse Ox


 


 Last 24 Hr  98 F-98.6 F    15-24  /60-81  











GENERAL: middle aged female, awake, alert, and fully oriented, in no acute 

distress, on venti-mask


HEENT: NC, AT


LUNGS: adequate air flow, diffuse wheezing, no rales


HEART: tachycardic, regular rhythm, no murmurs


ABDOMEN: Soft, nontender, nondistended, normoactive bowel sounds, no guarding, 

no 


rebound, no hepatosplenomegaly, no masses.


EXTREMITIES: 2+ pulses, warm, well-perfused, no edema. 


NEUROLOGICAL: Cranial nerves II through XII grossly intact. Normal speech, gait 

not 


observed.














 Laboratory Results - last 24 hr











  03/12/18 03/12/18 03/12/18





  05:20 05:32 12:50


 


WBC   


 


RBC   


 


Hgb   


 


Hct   


 


MCV   


 


MCH   


 


MCHC   


 


RDW   


 


Plt Count   


 


MPV   


 


Neutrophils %   


 


Lymphocytes %   


 


Monocytes %   


 


Eosinophils %   


 


Basophils %   


 


Anticoagulation Therapy   


 


Puncture Site   


 


ABG pH   


 


ABG pCO2 at Pt Temp   


 


ABG pO2 at Pt Temp   


 


ABG HCO3   


 


ABG O2 Sat (Measured)   


 


ABG O2 Content   


 


ABG Base Excess   


 


Ankur Test   


 


O2 Delivery Device   


 


Oxygen Flow Rate   


 


Vent Mode   


 


Vent Rate   


 


Mechanical Rate   


 


PEEP   


 


Pressure Support Vent   


 


Sodium  143  


 


Potassium  4.4  


 


Chloride  107  


 


Carbon Dioxide  24  


 


Anion Gap  12  


 


BUN  9  


 


Creatinine  0.9  


 


Creat Clearance w eGFR  > 60  


 


Random Glucose  178 H  


 


Calcium  8.4 L  


 


Phosphorus  3.0  


 


Magnesium  2.7 H  


 


Total Bilirubin  0.6  


 


AST  64 H  


 


ALT  43  


 


Alkaline Phosphatase  85  


 


Creatine Kinase  351 H  


 


Creatine Kinase Index  1.0  


 


CK-MB (CK-2)  3.817 H  


 


Troponin I  0.08 H   0.03


 


Total Protein  7.4  


 


Albumin  3.6  


 


Blood Type   O POSITIVE 


 


Antibody Screen   Negative 














  03/13/18 03/13/18 03/13/18





  05:30 05:30 06:25


 


WBC  17.8 H D  


 


RBC  4.22  


 


Hgb  12.8  


 


Hct  38.4  


 


MCV  91.0  


 


MCH  30.4  


 


MCHC  33.4  


 


RDW  13.1  


 


Plt Count  210  


 


MPV  10.1  


 


Neutrophils %  94.1 H  


 


Lymphocytes %  3.2 L D  


 


Monocytes %  2.2 L D  


 


Eosinophils %  0.2  D  


 


Basophils %  0.3  


 


Anticoagulation Therapy    No Result Required.


 


Puncture Site    Right radial


 


ABG pH    7.33 L


 


ABG pCO2 at Pt Temp    49.7 H


 


ABG pO2 at Pt Temp    51.0 L D


 


ABG HCO3    25.4


 


ABG O2 Sat (Measured)    82.7 L


 


ABG O2 Content    15.0


 


ABG Base Excess    -0.5


 


Ankur Test    Positive


 


O2 Delivery Device    Bipap


 


Oxygen Flow Rate    100%


 


Vent Mode    S/t


 


Vent Rate    14


 


Mechanical Rate    Yes


 


PEEP    0.0


 


Pressure Support Vent    Ipap 10 / epap 5


 


Sodium   141 


 


Potassium   4.4 


 


Chloride   103 


 


Carbon Dioxide   28 


 


Anion Gap   10 


 


BUN   9 


 


Creatinine   0.8 


 


Creat Clearance w eGFR   > 60 


 


Random Glucose   150 H 


 


Calcium   8.9 


 


Phosphorus   3.3 


 


Magnesium   2.4 


 


Total Bilirubin   1.0  D 


 


AST   19 


 


ALT   31 


 


Alkaline Phosphatase   71 


 


Creatine Kinase   


 


Creatine Kinase Index   


 


CK-MB (CK-2)   


 


Troponin I   


 


Total Protein   6.6 


 


Albumin   3.4 


 


Blood Type   


 


Antibody Screen   








Active Medications











Generic Name Dose Route Start Last Admin





  Trade Name Freq  PRN Reason Stop Dose Admin


 


Acetaminophen  1,000 mg  03/12/18 02:51  03/12/18 03:14





  Ofirmev Injection -  IVPB   1,000 mg





  Q6H PRN   Administration





  PAIN LEVEL 6-10   


 


Albuterol Sulfate  1 amp  03/12/18 12:31  





  Ventolin 0.083% Nebulizer Soln -  NEB   





  Q4H PRN   





  SHORT OF BREATH/WHEEZING   


 


Albuterol/Ipratropium  1 amp  03/12/18 08:00  03/13/18 07:50





  Duoneb -  NEB   1 amp





  Q4H [facility]   Administration


 


Chlorhexidine Gluconate  1 applic  03/12/18 22:00  03/12/18 21:18





  Hibiclens For Decolonization -  TP   1 applic





  HS [facility]   Administration


 


Enoxaparin Sodium  40 mg  03/13/18 10:00  





  Lovenox -  SQ   





  DAILY [facility]   


 


Dextrose/Sodium Chloride  1,000 mls @ 42 mls/hr  03/12/18 00:45  03/12/18 03:14





  D5-1/2ns -  IV   42 mls/hr





  ASDIR [facility]   Administration


 


Azithromycin 500 mg/ Dextrose  250 mls @ 250 mls/hr  03/12/18 02:30  03/12/18 21

:11





  IVPB   250 mls/hr





  DAILY [facility]   Administration


 


CEFTRIAXONE 1 G/50 ML PREMIX  50 mls @ 100 mls/hr  03/12/18 22:00  03/12/18 21:

19





  Ceftriaxone 1 Gm-D5w Bag  IVPB   100 mls/hr





  DAILY@2200 [facility]   Administration


 


Methylprednisolone Sodium Succinate  60 mg  03/12/18 06:00  03/12/18 21:13





  Solu-Medrol -  IVPB   60 mg





  BID [facility]   Administration


 


Mupirocin  1 applic  03/12/18 10:00  03/12/18 21:21





  Bactroban Ointment (For Decolonization) -  NS  03/17/18 09:59  1 applic





  BID [facility]   Administration











ASSESSMENT/PLAN:


52F w/ hx of asthma (no prior intubations) who presented with acute cough and 

SOB, admitted for acute hypoxic respiratory failure and acute asthma 

exacerbation. 





#Respiratory


   -acute hypoxic resp failure 2/2 PNA vs. asthma exacerbation: improving


   -CTA chest showed SHANTANU collapse with mild LLL mucoid impaction


   -continue ceftriaxone and azithromycin, day 3


   -continue solumedrol


   -continue duonebs standing and albuterol nebs PRN


   -tamiflu D/C'd per primary team as flu is of low suspicion


   -wean from bipap as tolerated


   -afebrile, leukocytosis of 17.8 


   -ABG shows respiratory acidosis: 7.33/50/25


   -APAP for fever or pain


   -peak flow measurements


   -OOB to chair





#Cardiac


   -tachycardia of , likely 2/2 SOB and pain


   -BPs wnl


   -mild troponinemia of 0.08, likely 2/2 demand ischemia. repeat trop of 0.03





#FEN/ppx


   -D5-1/2NS @ 42


   -electrolytes wnl


   -regular diet


   -no GI ppx 


   -lovenox





Case discussed with attending, Dr. Flores.





-Aristides Guo MD PGY1








Visit type





- Emergency Visit


Emergency Visit: Yes


ED Registration Date: 03/12/18


Care time: The patient presented to the Emergency Department on the above date 

and was hospitalized for further evaluation of their emergent condition.





- New Patient


This patient is new to me today: No





- Critical Care


Critical Care patient: Yes


Total Critical Care Time (in minutes): 38


Critical Care Statement: The care of this patient involved high complexity 

decision making to prevent further life threatening deterioration of the patient

's condition and/or to evaluate & treat vital organ system(s) failure or risk 

of failure.

## 2018-03-13 NOTE — PN
Teaching Attending Note


Name of Resident: Aristides Guo





ATTENDING PHYSICIAN STATEMENT





I saw and evaluated the patient.


I reviewed the resident's note and discussed the case with the resident.


I agree with the resident's findings and plan as documented.








SUBJECTIVE:





Pt seen and examined in the ICU. Still with significant shortness of breath. On 

BiPAP overnight, currently on 50% ventimask. +cough with brown sputum. No 

fevers recorded.





OBJECTIVE:





 Last Vital Signs











Temp Pulse Resp BP Pulse Ox


 


 98.8 F   86   17   99/58   98 


 


 03/13/18 10:00  03/13/18 10:00  03/13/18 10:00  03/13/18 10:00  03/13/18 08:15








 Intake & Output











 03/10/18 03/11/18 03/12/18 03/13/18





 22:59 23:59 23:59 23:59


 


Intake Total   1555 336


 


Output Total   1750 


 


Balance   -195 336


 


Weight   81.363 kg 79.243 kg








Gen: tachypneic at rest


Heart: RRR


Lung: bilateral rhonchi, wheezes


Abd: soft, nontender


Ext: no edema





 CBC, BMP





 03/13/18 05:30 





 03/13/18 05:30 





Active Medications





Acetaminophen (Ofirmev Injection -)  1,000 mg IVPB Q6H PRN


   PRN Reason: PAIN LEVEL 6-10


   Last Admin: 03/12/18 03:14 Dose:  1,000 mg


Albuterol Sulfate (Ventolin 0.083% Nebulizer Soln -)  1 amp NEB Q4H PRN


   PRN Reason: SHORT OF BREATH/WHEEZING


Albuterol/Ipratropium (Duoneb -)  1 amp NEB Q4H [facility]


   Last Admin: 03/13/18 07:50 Dose:  1 amp


Chlorhexidine Gluconate (Hibiclens For Decolonization -)  1 applic TP HS [facility]


   Last Admin: 03/12/18 21:18 Dose:  1 applic


Enoxaparin Sodium (Lovenox -)  40 mg SQ DAILY [facility]


   Last Admin: 03/13/18 10:45 Dose:  40 mg


Dextrose/Sodium Chloride (D5-1/2ns -)  1,000 mls @ 42 mls/hr IV ASDIR [facility]


   Last Admin: 03/13/18 10:45 Dose:  42 mls/hr


Azithromycin 500 mg/ Dextrose  250 mls @ 250 mls/hr IVPB DAILY [facility]


   Last Admin: 03/13/18 10:44 Dose:  250 mls/hr


CEFTRIAXONE 1 G/50 ML PREMIX (Ceftriaxone 1 Gm-D5w Bag)  50 mls @ 100 mls/hr 

IVPB DAILY@2200 [facility]


   Last Admin: 03/12/18 21:19 Dose:  100 mls/hr


Methylprednisolone Sodium Succinate (Solu-Medrol -)  60 mg IVPB BID [facility]


   Last Admin: 03/13/18 10:45 Dose:  60 mg


Mupirocin (Bactroban Ointment (For Decolonization) -)  1 applic NS BID [facility]


   Stop: 03/17/18 09:59


   Last Admin: 03/13/18 10:44 Dose:  1 applic








ASSESSMENT AND PLAN:


Acute Hypoxic Respiratory Failure


Acute Asthma Exacerbation


r/o Pneumonia


r/o Influenza


Atelectasis resolving





-  continue medrol


-  inhaled bronchodilators standing and PRN


-  continue antibiotics


-  f/u cultures


-  O2 to keep Spo2 >90%


-  BiPAP to assist in work of breathing


-  monitor peak flow


-  start singulair


-  PO as tolerated


-  DVT prophylaxis











critical care time spent in reviewing chart, evaluating patient and formulating 

plan 35 min

## 2018-03-13 NOTE — PN
Teaching Attending Note


Name of Resident: Emeterio Bowers





ATTENDING PHYSICIAN STATEMENT





I saw and evaluated the patient.


I reviewed the resident's note and discussed the case with the resident.


I agree with the resident's findings and plan as documented.








SUBJECTIVE: Patient complains of a cough with green sputum. She says SOB is 

improving.








OBJECTIVE:


 Vital Signs











 Period  Temp  Pulse  Resp  BP Sys/Herrera  Pulse Ox


 


 Last 24 Hr  98 F-98.8 F    15-24  /39-75  








HEART: S1S2, RRR


LUNGS: Diffuse wheezes


ABDOMEN: Soft, non-tender, non-distended, normal BS


EXTREMITIES: No edema





 Laboratory Results - last 24 hr











  03/12/18 03/13/18 03/13/18





  12:50 05:30 05:30


 


WBC   17.8 H D 


 


RBC   4.22 


 


Hgb   12.8 


 


Hct   38.4 


 


MCV   91.0 


 


MCH   30.4 


 


MCHC   33.4 


 


RDW   13.1 


 


Plt Count   210 


 


MPV   10.1 


 


Neutrophils %   94.1 H 


 


Lymphocytes %   3.2 L D 


 


Monocytes %   2.2 L D 


 


Eosinophils %   0.2  D 


 


Basophils %   0.3 


 


Anticoagulation Therapy   


 


Puncture Site   


 


ABG pH   


 


ABG pCO2 at Pt Temp   


 


ABG pO2 at Pt Temp   


 


ABG HCO3   


 


ABG O2 Sat (Measured)   


 


ABG O2 Content   


 


ABG Base Excess   


 


Ankur Test   


 


O2 Delivery Device   


 


Oxygen Flow Rate   


 


Vent Mode   


 


Vent Rate   


 


Mechanical Rate   


 


PEEP   


 


Pressure Support Vent   


 


Sodium    141


 


Potassium    4.4


 


Chloride    103


 


Carbon Dioxide    28


 


Anion Gap    10


 


BUN    9


 


Creatinine    0.8


 


Creat Clearance w eGFR    > 60


 


Random Glucose    150 H


 


Calcium    8.9


 


Phosphorus    3.3


 


Magnesium    2.4


 


Total Bilirubin    1.0  D


 


AST    19


 


ALT    31


 


Alkaline Phosphatase    71


 


Troponin I  0.03  


 


Total Protein    6.6


 


Albumin    3.4














  03/13/18





  06:25


 


WBC 


 


RBC 


 


Hgb 


 


Hct 


 


MCV 


 


MCH 


 


MCHC 


 


RDW 


 


Plt Count 


 


MPV 


 


Neutrophils % 


 


Lymphocytes % 


 


Monocytes % 


 


Eosinophils % 


 


Basophils % 


 


Anticoagulation Therapy  No Result Required.


 


Puncture Site  Right radial


 


ABG pH  7.33 L


 


ABG pCO2 at Pt Temp  49.7 H


 


ABG pO2 at Pt Temp  51.0 L D


 


ABG HCO3  25.4


 


ABG O2 Sat (Measured)  82.7 L


 


ABG O2 Content  15.0


 


ABG Base Excess  -0.5


 


Ankur Test  Positive


 


O2 Delivery Device  Bipap


 


Oxygen Flow Rate  100%


 


Vent Mode  S/t


 


Vent Rate  14


 


Mechanical Rate  Yes


 


PEEP  0.0


 


Pressure Support Vent  Ipap 10 / epap 5


 


Sodium 


 


Potassium 


 


Chloride 


 


Carbon Dioxide 


 


Anion Gap 


 


BUN 


 


Creatinine 


 


Creat Clearance w eGFR 


 


Random Glucose 


 


Calcium 


 


Phosphorus 


 


Magnesium 


 


Total Bilirubin 


 


AST 


 


ALT 


 


Alkaline Phosphatase 


 


Troponin I 


 


Total Protein 


 


Albumin 








 Current Medications











Generic Name Dose Route Start Last Admin





  Trade Name Freq  PRN Reason Stop Dose Admin


 


Acetaminophen  1,000 mg  03/12/18 02:51  03/12/18 03:14





  Ofirmev Injection -  IVPB   1,000 mg





  Q6H PRN   Administration





  PAIN LEVEL 6-10   


 


Albuterol Sulfate  1 amp  03/12/18 12:31  





  Ventolin 0.083% Nebulizer Soln -  NEB   





  Q4H PRN   





  SHORT OF BREATH/WHEEZING   


 


Albuterol/Ipratropium  1 amp  03/12/18 08:00  03/13/18 11:51





  Duoneb -  NEB   1 amp





  Q4H SHUBHAM   Administration


 


Chlorhexidine Gluconate  1 applic  03/12/18 22:00  03/12/18 21:18





  Hibiclens For Decolonization -  TP   1 applic





  HS SHUBHAM   Administration


 


Enoxaparin Sodium  40 mg  03/13/18 10:00  03/13/18 10:45





  Lovenox -  SQ   40 mg





  DAILY SHUBHAM   Administration


 


Dextrose/Sodium Chloride  1,000 mls @ 42 mls/hr  03/12/18 00:45  03/13/18 10:45





  D5-1/2ns -  IV   42 mls/hr





  ASDIR SHUBHAM   Administration


 


Azithromycin 500 mg/ Dextrose  250 mls @ 250 mls/hr  03/12/18 02:30  03/13/18 10

:44





  IVPB   250 mls/hr





  DAILY SHUBHAM   Administration


 


CEFTRIAXONE 1 G/50 ML PREMIX  50 mls @ 100 mls/hr  03/12/18 22:00  03/12/18 21:

19





  Ceftriaxone 1 Gm-D5w Bag  IVPB   100 mls/hr





  DAILY@2200 SHUBHAM   Administration


 


Methylprednisolone Sodium Succinate  60 mg  03/12/18 06:00  03/13/18 10:45





  Solu-Medrol -  IVPB   60 mg





  BID SHUBHAM   Administration


 


Montelukast Sodium  10 mg  03/13/18 22:00  





  Singulair -  PO   





  HS SHUBHAM   


 


Mupirocin  1 applic  03/12/18 10:00  03/13/18 10:44





  Bactroban Ointment (For Decolonization) -  NS  03/17/18 09:59  1 applic





  BID SHUBHAM   Administration














ASSESSMENT AND PLAN:


This is a 52 year old woman with a history of asthma who presented to the ED 

with SOB and cough.





1. Acute hypoxic respiratory failure secondary to acute asthma exacerbation 

with atelectasis


   - Continue SoluMedrol, Singulair, DuoNeb


   - Continue Rocephin, Zithromax for possible pneumonia

## 2018-03-13 NOTE — PN
Physical Exam: 


SUBJECTIVE: Patient seen and examined





- No major overnight events. Afebrile, hemo stable. Tolerated NC briefly from 6-

645 PM yesterday with sats 92-94%. Placed back on bipap overnight. Plan for 

possible bronchoscopy per ICU team. still w/ productive cough. No f/c/n/v/d. No 

abdominal pain, CP with cough/deep inspiration. 





OBJECTIVE:





 Vital Signs


 Intake & Output











 03/10/18 03/11/18 03/12/18 03/13/18





 22:59 23:59 23:59 23:59


 


Intake Total   1555 336


 


Output Total   1750 


 


Balance   -195 336


 


Weight   81.363 kg 79.243 kg

















 Period  Temp  Pulse  Resp  BP Sys/Herrera  Pulse Ox


 


 Last 24 Hr  98 F-98.6 F    15-24  /60-81  











GENERAL: A&Ox3. NAD. On NC 


HEAD: Normal with no signs of trauma.


EYES: PERRL, extraocular movements intact, sclera anicteric, conjunctiva clear. 

No ptosis. 


ENT: Ears normal, nares patent, oropharynx clear without exudates, moist mucous 

membranes.


NECK: Trachea midline, full range of motion, supple. 


LUNGS: BL I/E wheezing in upper lung fields. Poor air entry throughout. No 

crackles or rhonchi. No accessory muscle use.  


HEART: Regular rate and rhythm, S1, S2 without murmur, rub or gallop.


ABDOMEN: Soft, nontender, nondistended, normoactive bowel sounds, no guarding, 

no rebound, no hepatosplenomegaly, no masses.


EXTREMITIES: 2+ DP/PT pulses, warm, well-perfused, no edema. 


NEUROLOGICAL: Cranial nerves II through XII grossly intact. Normal speech, gait 

not observed. 5/5 strength and preserved sensation to light touch in all 

extremities. 








 Laboratory Results - last 24 hr


 CBC, BMP





 03/13/18 05:30 





 03/13/18 05:30 














  03/12/18 03/12/18 03/12/18





  05:20 05:20 05:20


 


WBC  11.6 H  


 


RBC  4.63  


 


Hgb  13.9  


 


Hct  42.8  


 


MCV  92.5  


 


MCH  30.0  


 


MCHC  32.4  


 


RDW  13.1  


 


Plt Count  205  


 


MPV  10.2  


 


Neutrophils %  96.5 H  


 


Lymphocytes %  2.5 L D  


 


Monocytes %  0.9 L D  


 


Eosinophils %  0.0  D  


 


Basophils %  0.1  


 


PT with INR   11.30 


 


INR   1.00 


 


PTT (Actin FS)   85.3 H D 


 


Anticoagulation Therapy   


 


Puncture Site   


 


ABG pH   


 


ABG pCO2 at Pt Temp   


 


ABG pO2 at Pt Temp   


 


ABG HCO3   


 


ABG O2 Sat (Measured)   


 


ABG O2 Content   


 


ABG Base Excess   


 


Ankur Test   


 


O2 Delivery Device   


 


Oxygen Flow Rate   


 


Vent Mode   


 


Vent Rate   


 


Mechanical Rate   


 


Pressure Support Vent   


 


Sodium    143


 


Potassium    4.4


 


Chloride    107


 


Carbon Dioxide    24


 


Anion Gap    12


 


BUN    9


 


Creatinine    0.9


 


Creat Clearance w eGFR    > 60


 


Random Glucose    178 H


 


Calcium    8.4 L


 


Phosphorus    3.0


 


Magnesium    2.7 H


 


Total Bilirubin    0.6


 


AST    64 H


 


ALT    43


 


Alkaline Phosphatase    85


 


Creatine Kinase    351 H


 


Creatine Kinase Index    1.0


 


CK-MB (CK-2)    3.817 H


 


Troponin I    0.08 H


 


Total Protein    7.4


 


Albumin    3.6


 


Blood Type   


 


Antibody Screen   














  03/12/18 03/12/18 03/12/18





  05:32 06:38 12:50


 


WBC   


 


RBC   


 


Hgb   


 


Hct   


 


MCV   


 


MCH   


 


MCHC   


 


RDW   


 


Plt Count   


 


MPV   


 


Neutrophils %   


 


Lymphocytes %   


 


Monocytes %   


 


Eosinophils %   


 


Basophils %   


 


PT with INR   


 


INR   


 


PTT (Actin FS)   


 


Anticoagulation Therapy   No Result Required. 


 


Puncture Site   Right radial 


 


ABG pH   7.31 L 


 


ABG pCO2 at Pt Temp   46.9 H 


 


ABG pO2 at Pt Temp   110.0 H 


 


ABG HCO3   22.8 


 


ABG O2 Sat (Measured)   97.9 


 


ABG O2 Content   19.2 


 


ABG Base Excess   -3.2 L 


 


Ankur Test   Positive 


 


O2 Delivery Device   Bipap 


 


Oxygen Flow Rate   100% 


 


Vent Mode   S/t 


 


Vent Rate   14 


 


Mechanical Rate   No Result Required. 


 


Pressure Support Vent   10/5 


 


Sodium   


 


Potassium   


 


Chloride   


 


Carbon Dioxide   


 


Anion Gap   


 


BUN   


 


Creatinine   


 


Creat Clearance w eGFR   


 


Random Glucose   


 


Calcium   


 


Phosphorus   


 


Magnesium   


 


Total Bilirubin   


 


AST   


 


ALT   


 


Alkaline Phosphatase   


 


Creatine Kinase   


 


Creatine Kinase Index   


 


CK-MB (CK-2)   


 


Troponin I    0.03


 


Total Protein   


 


Albumin   


 


Blood Type  O POSITIVE  


 


Antibody Screen  Negative  














  03/13/18 03/13/18





  05:30 05:30


 


WBC  17.8 H D 


 


RBC  4.22 


 


Hgb  12.8 


 


Hct  38.4 


 


MCV  91.0 


 


MCH  30.4 


 


MCHC  33.4 


 


RDW  13.1 


 


Plt Count  210 


 


MPV  10.1 


 


Neutrophils %  94.1 H 


 


Lymphocytes %  3.2 L D 


 


Monocytes %  2.2 L D 


 


Eosinophils %  0.2  D 


 


Basophils %  0.3 


 


PT with INR  


 


INR  


 


PTT (Actin FS)  


 


Anticoagulation Therapy  


 


Puncture Site  


 


ABG pH  


 


ABG pCO2 at Pt Temp  


 


ABG pO2 at Pt Temp  


 


ABG HCO3  


 


ABG O2 Sat (Measured)  


 


ABG O2 Content  


 


ABG Base Excess  


 


Ankur Test  


 


O2 Delivery Device  


 


Oxygen Flow Rate  


 


Vent Mode  


 


Vent Rate  


 


Mechanical Rate  


 


Pressure Support Vent  


 


Sodium   141


 


Potassium   4.4


 


Chloride   103


 


Carbon Dioxide   28


 


Anion Gap   10


 


BUN   9


 


Creatinine   0.8


 


Creat Clearance w eGFR   > 60


 


Random Glucose   150 H


 


Calcium   8.9


 


Phosphorus   3.3


 


Magnesium   2.4


 


Total Bilirubin   1.0  D


 


AST   19


 


ALT   31


 


Alkaline Phosphatase   71


 


Creatine Kinase  


 


Creatine Kinase Index  


 


CK-MB (CK-2)  


 


Troponin I  


 


Total Protein   6.6


 


Albumin   3.4


 


Blood Type  


 


Antibody Screen  








Active Medications











Generic Name Dose Route Start Last Admin





  Trade Name Freq  PRN Reason Stop Dose Admin


 


Acetaminophen  1,000 mg  03/12/18 02:51  03/12/18 03:14





  Ofirmev Injection -  IVPB   1,000 mg





  Q6H PRN   Administration





  PAIN LEVEL 6-10   


 


Albuterol Sulfate  1 amp  03/12/18 12:31  





  Ventolin 0.083% Nebulizer Soln -  NEB   





  Q4H PRN   





  SHORT OF BREATH/WHEEZING   


 


Albuterol/Ipratropium  1 amp  03/12/18 08:00  03/13/18 04:08





  Duoneb -  NEB   1 amp





  Q4H SHUBHAM   Administration


 


Chlorhexidine Gluconate  1 applic  03/12/18 22:00  03/12/18 21:18





  Hibiclens For Decolonization -  TP   1 applic





  HS SHUBHAM   Administration


 


Enoxaparin Sodium  40 mg  03/13/18 10:00  





  Lovenox -  SQ   





  DAILY SHUBHAM   


 


Dextrose/Sodium Chloride  1,000 mls @ 42 mls/hr  03/12/18 00:45  03/12/18 03:14





  D5-1/2ns -  IV   42 mls/hr





  ASDIR SHUBHAM   Administration


 


Azithromycin 500 mg/ Dextrose  250 mls @ 250 mls/hr  03/12/18 02:30  03/12/18 21

:11





  IVPB   250 mls/hr





  DAILY SHUBHAM   Administration


 


CEFTRIAXONE 1 G/50 ML PREMIX  50 mls @ 100 mls/hr  03/12/18 22:00  03/12/18 21:

19





  Ceftriaxone 1 Gm-D5w Bag  IVPB   100 mls/hr





  DAILY@2200 SHUBHAM   Administration


 


Methylprednisolone Sodium Succinate  60 mg  03/12/18 06:00  03/12/18 21:13





  Solu-Medrol -  IVPB   60 mg





  BID SHUBHAM   Administration


 


Mupirocin  1 applic  03/12/18 10:00  03/12/18 21:21





  Bactroban Ointment (For Decolonization) -  NS  03/17/18 09:59  1 applic





  BID SHUBHAM   Administration





 Microbiology





03/12/18 05:20   Urine - Urine Clean Catch   Legionella Antigen - Final


03/12/18 05:20   Urine - Urine Clean Catch   Streptococcus pneumoniae Antigen (

M - Final





CXR 3/11 - Impression: No acute pathology. 





CTA chest 3/12 - 1. No evidence of pulmonary embolism. 2. Left upper lobe 

consolidation/atelectasis. 3. Right middle lobe nodule for which short-term CT 

follow-up is now recommended. Please see above discussion.





CXR 3/13 - Upper lobe atelectasis significantly improved. 





ASSESSMENT/PLAN:


53 yo F with PMH of asthma (no prior admissions/intubations), who presented to 

ED w/ 4 day hx of cough, SOB and pleuritic chest pain. Continues to improve on 

bipap overnight, NC during AM. Still with wheezing, poor air entry





#Acute hypoxic respiratory failure 2/2 asthma exacerbation vs. PNA - CTA 

negative for PE/pneumothorax; WBC 11.6 -> 17.8 on steroids; Ddimer elevated at 

1345; ABG this AM 7.33/50/51 on Bipap 100%; SHANTANU atelectasis much improved today 

on cxr


-Iv solumed 60mg Q12H


- Duonebs prn


- peak flows


- No bronchoscopy per ICU team


- chest PT w/ deep suctioning


- rocephin/azithro for CAP coverage. Day 2


- trend WBC, fever curve


- BiPap for O2 support overnight. Maintain sat >92%


- IVFs


- IV tylenol for fever, pain control


- Pulm following


- serial peak flows





#PPx:


-lovenox





FEN


D51/2 NS 42 cc


lytes wnl


Regular diet





Plan discussed with attending, Dr. Annabella Bowers, PGY1











Visit type





- Emergency Visit


Emergency Visit: Yes


ED Registration Date: 03/12/18


Care time: The patient presented to the Emergency Department on the above date 

and was hospitalized for further evaluation of their emergent condition.





- New Patient


This patient is new to me today: No





- Critical Care


Critical Care patient: Yes


Total Critical Care Time (in minutes): 35


Critical Care Statement: The care of this patient involved high complexity 

decision making to prevent further life threatening deterioration of the patient

's condition and/or to evaluate & treat vital organ system(s) failure or risk 

of failure.

## 2018-03-14 LAB
ALBUMIN SERPL-MCNC: 3.1 G/DL (ref 3.4–5)
ALP SERPL-CCNC: 65 U/L (ref 45–117)
ALT SERPL-CCNC: 23 U/L (ref 12–78)
ANION GAP SERPL CALC-SCNC: 8 MMOL/L (ref 8–16)
AST SERPL-CCNC: 13 U/L (ref 15–37)
BASOPHILS # BLD: 0.3 % (ref 0–2)
BILIRUB SERPL-MCNC: 1.2 MG/DL (ref 0.2–1)
BUN SERPL-MCNC: 14 MG/DL (ref 7–18)
CALCIUM SERPL-MCNC: 8.8 MG/DL (ref 8.5–10.1)
CHLORIDE SERPL-SCNC: 104 MMOL/L (ref 98–107)
CO2 SERPL-SCNC: 30 MMOL/L (ref 21–32)
CREAT SERPL-MCNC: 0.8 MG/DL (ref 0.55–1.02)
DEPRECATED RDW RBC AUTO: 13 % (ref 11.6–15.6)
EOSINOPHIL # BLD: 0.1 % (ref 0–4.5)
GLUCOSE SERPL-MCNC: 129 MG/DL (ref 74–106)
HCT VFR BLD CALC: 36.6 % (ref 32.4–45.2)
HGB BLD-MCNC: 12.2 GM/DL (ref 10.7–15.3)
LYMPHOCYTES # BLD: 4 % (ref 8–40)
MAGNESIUM SERPL-MCNC: 2.5 MG/DL (ref 1.8–2.4)
MCH RBC QN AUTO: 30.6 PG (ref 25.7–33.7)
MCHC RBC AUTO-ENTMCNC: 33.4 G/DL (ref 32–36)
MCV RBC: 91.5 FL (ref 80–96)
MONOCYTES # BLD AUTO: 2.1 % (ref 3.8–10.2)
NEUTROPHILS # BLD: 93.5 % (ref 42.8–82.8)
PHOSPHATE SERPL-MCNC: 3.7 MG/DL (ref 2.5–4.9)
PLATELET # BLD AUTO: 211 K/MM3 (ref 134–434)
PMV BLD: 9.9 FL (ref 7.5–11.1)
POTASSIUM SERPLBLD-SCNC: 4.6 MMOL/L (ref 3.5–5.1)
PROT SERPL-MCNC: 6.3 G/DL (ref 6.4–8.2)
RBC # BLD AUTO: 4 M/MM3 (ref 3.6–5.2)
SODIUM SERPL-SCNC: 142 MMOL/L (ref 136–145)
WBC # BLD AUTO: 15.4 K/MM3 (ref 4–10)

## 2018-03-14 RX ADMIN — IPRATROPIUM BROMIDE AND ALBUTEROL SULFATE SCH AMP: .5; 3 SOLUTION RESPIRATORY (INHALATION) at 08:10

## 2018-03-14 RX ADMIN — IPRATROPIUM BROMIDE AND ALBUTEROL SULFATE SCH AMP: .5; 3 SOLUTION RESPIRATORY (INHALATION) at 22:54

## 2018-03-14 RX ADMIN — DOCUSATE SODIUM SCH MG: 100 CAPSULE, LIQUID FILLED ORAL at 21:35

## 2018-03-14 RX ADMIN — METHYLPREDNISOLONE SODIUM SUCCINATE SCH MG: 125 INJECTION, POWDER, FOR SOLUTION INTRAMUSCULAR; INTRAVENOUS at 09:48

## 2018-03-14 RX ADMIN — DEXTROSE AND SODIUM CHLORIDE SCH MLS/HR: 5; 450 INJECTION, SOLUTION INTRAVENOUS at 09:50

## 2018-03-14 RX ADMIN — DEXTROSE AND SODIUM CHLORIDE SCH: 5; 450 INJECTION, SOLUTION INTRAVENOUS at 01:00

## 2018-03-14 RX ADMIN — IPRATROPIUM BROMIDE AND ALBUTEROL SULFATE SCH AMP: .5; 3 SOLUTION RESPIRATORY (INHALATION) at 00:18

## 2018-03-14 RX ADMIN — SENNOSIDES SCH TAB: 8.6 TABLET, FILM COATED ORAL at 21:35

## 2018-03-14 RX ADMIN — AZITHROMYCIN DIHYDRATE SCH MLS/HR: 500 INJECTION, POWDER, LYOPHILIZED, FOR SOLUTION INTRAVENOUS at 09:48

## 2018-03-14 RX ADMIN — IPRATROPIUM BROMIDE AND ALBUTEROL SULFATE SCH AMP: .5; 3 SOLUTION RESPIRATORY (INHALATION) at 04:30

## 2018-03-14 RX ADMIN — IPRATROPIUM BROMIDE AND ALBUTEROL SULFATE SCH AMP: .5; 3 SOLUTION RESPIRATORY (INHALATION) at 11:52

## 2018-03-14 RX ADMIN — ENOXAPARIN SODIUM SCH MG: 40 INJECTION SUBCUTANEOUS at 09:48

## 2018-03-14 RX ADMIN — IPRATROPIUM BROMIDE AND ALBUTEROL SULFATE SCH AMP: .5; 3 SOLUTION RESPIRATORY (INHALATION) at 15:59

## 2018-03-14 RX ADMIN — MUPIROCIN SCH APPLIC: 20 OINTMENT TOPICAL at 09:50

## 2018-03-14 RX ADMIN — MONTELUKAST SODIUM SCH MG: 10 TABLET, COATED ORAL at 21:35

## 2018-03-14 RX ADMIN — METHYLPREDNISOLONE SODIUM SUCCINATE SCH MG: 40 INJECTION, POWDER, FOR SOLUTION INTRAMUSCULAR; INTRAVENOUS at 21:38

## 2018-03-14 NOTE — PN
Physical Exam: 


SUBJECTIVE: Patient seen and examined





- no overnight events. BiPAP at 10/5/14/FiO2 60% at night. Less SOB/wheezing. 

Tolerating NC 6L w/ sat 97%. Afebrile, tachy to 107. 


- Still with productive cough, retrostrernal CP w/ cough. Productive of green/

brown sputum. No BM since admission; tolerating PO feeds; NO f/c/n/v





OBJECTIVE:





 Vital Signs


 Intake & Output











 03/11/18 03/12/18 03/13/18 03/14/18





 23:59 23:59 23:59 23:59


 


Intake Total  1555 1527 


 


Output Total  1750  


 


Balance  -195 1527 


 


Weight  81.363 kg 79.243 kg 

















 Period  Temp  Pulse  Resp  BP Sys/Herrera  Pulse Ox


 


 Last 24 Hr  98 F-99 F    17-23  /39-84  











GENERAL: A&Ox3. NAD. 


HEAD: Normal with no signs of trauma.


EYES: PERRL, extraocular movements intact, sclera anicteric, conjunctiva clear. 

No ptosis. 


ENT: Ears normal, nares patent, oropharynx clear without exudates, moist mucous 

membranes.


NECK: Trachea midline, full range of motion, supple. 


LUNGS: Upper airway congestion. Trace BL I/E wheezing in all lung fields. 

Improved air entry. Mild bibasilar rhonchi. No accessory muscle use.  


HEART: Regular rate and rhythm, S1, S2 without murmur, rub or gallop.


ABDOMEN: Soft, nontender, nondistended, normoactive bowel sounds, no guarding, 

no rebound, no hepatosplenomegaly, no masses.


EXTREMITIES: 2+ DP/PT pulses, warm, well-perfused, no edema. 


NEUROLOGICAL: Cranial nerves II through XII grossly intact. Normal speech, gait 

not observed. 5/5 strength and preserved sensation to light touch in all 

extremities. 











 Laboratory Results - last 24 hr


 CBC, BMP





 03/14/18 05:25 





 03/14/18 05:25 














  03/13/18 03/13/18 03/14/18





  05:30 06:25 05:25


 


WBC    15.4 H


 


RBC    4.00


 


Hgb    12.2


 


Hct    36.6


 


MCV    91.5


 


MCH    30.6


 


MCHC    33.4


 


RDW    13.0


 


Plt Count    211


 


MPV    9.9


 


Neutrophils %    93.5 H


 


Lymphocytes %    4.0 L D


 


Monocytes %    2.1 L


 


Eosinophils %    0.1


 


Basophils %    0.3


 


Anticoagulation Therapy   No Result Required. 


 


Puncture Site   Right radial 


 


ABG pH   7.33 L 


 


ABG pCO2 at Pt Temp   49.7 H 


 


ABG pO2 at Pt Temp   51.0 L D 


 


ABG HCO3   25.4 


 


ABG O2 Sat (Measured)   82.7 L 


 


ABG O2 Content   15.0 


 


ABG Base Excess   -0.5 


 


Ankur Test   Positive 


 


O2 Delivery Device   Bipap 


 


Oxygen Flow Rate   100% 


 


Vent Mode   S/t 


 


Vent Rate   14 


 


Mechanical Rate   Yes 


 


PEEP   0.0 


 


Pressure Support Vent   Ipap 10 / epap 5 


 


Sodium  141  


 


Potassium  4.4  


 


Chloride  103  


 


Carbon Dioxide  28  


 


Anion Gap  10  


 


BUN  9  


 


Creatinine  0.8  


 


Creat Clearance w eGFR  > 60  


 


Random Glucose  150 H  


 


Calcium  8.9  


 


Phosphorus  3.3  


 


Magnesium  2.4  


 


Total Bilirubin  1.0  D  


 


AST  19  


 


ALT  31  


 


Alkaline Phosphatase  71  


 


Total Protein  6.6  


 


Albumin  3.4  














  03/14/18





  05:25


 


WBC 


 


RBC 


 


Hgb 


 


Hct 


 


MCV 


 


MCH 


 


MCHC 


 


RDW 


 


Plt Count 


 


MPV 


 


Neutrophils % 


 


Lymphocytes % 


 


Monocytes % 


 


Eosinophils % 


 


Basophils % 


 


Anticoagulation Therapy 


 


Puncture Site 


 


ABG pH 


 


ABG pCO2 at Pt Temp 


 


ABG pO2 at Pt Temp 


 


ABG HCO3 


 


ABG O2 Sat (Measured) 


 


ABG O2 Content 


 


ABG Base Excess 


 


Ankur Test 


 


O2 Delivery Device 


 


Oxygen Flow Rate 


 


Vent Mode 


 


Vent Rate 


 


Mechanical Rate 


 


PEEP 


 


Pressure Support Vent 


 


Sodium  142


 


Potassium  4.6


 


Chloride  104


 


Carbon Dioxide  30


 


Anion Gap  8


 


BUN  14


 


Creatinine  0.8


 


Creat Clearance w eGFR  > 60


 


Random Glucose  129 H


 


Calcium  8.8


 


Phosphorus  3.7


 


Magnesium  2.5 H


 


Total Bilirubin  1.2 H


 


AST  13 L


 


ALT  23


 


Alkaline Phosphatase  65


 


Total Protein  6.3 L


 


Albumin  3.1 L








Active Medications











Generic Name Dose Route Start Last Admin





  Trade Name Freq  PRN Reason Stop Dose Admin


 


Acetaminophen  1,000 mg  03/12/18 02:51  03/12/18 03:14





  Ofirmev Injection -  IVPB   1,000 mg





  Q6H PRN   Administration





  PAIN LEVEL 6-10   


 


Albuterol Sulfate  1 amp  03/12/18 12:31  





  Ventolin 0.083% Nebulizer Soln -  NEB   





  Q4H PRN   





  SHORT OF BREATH/WHEEZING   


 


Albuterol/Ipratropium  1 amp  03/12/18 08:00  03/14/18 04:30





  Duoneb -  NEB   1 amp





  Q4H SHUBHAM   Administration


 


Chlorhexidine Gluconate  1 applic  03/12/18 22:00  03/13/18 22:35





  Hibiclens For Decolonization -  TP   1 applic





  HS SHUBHAM   Administration


 


Enoxaparin Sodium  40 mg  03/13/18 10:00  03/13/18 10:45





  Lovenox -  SQ   40 mg





  DAILY SHUBHAM   Administration


 


Dextrose/Sodium Chloride  1,000 mls @ 42 mls/hr  03/12/18 00:45  03/13/18 10:45





  D5-1/2ns -  IV   42 mls/hr





  ASDIR SHUBHAM   Administration


 


Azithromycin 500 mg/ Dextrose  250 mls @ 250 mls/hr  03/12/18 02:30  03/13/18 10

:44





  IVPB   250 mls/hr





  DAILY SHUBHAM   Administration


 


CEFTRIAXONE 1 G/50 ML PREMIX  50 mls @ 100 mls/hr  03/12/18 22:00  03/13/18 22:

34





  Ceftriaxone 1 Gm-D5w Bag  IVPB   100 mls/hr





  DAILY@2200 SHUBHAM   Administration


 


Methylprednisolone Sodium Succinate  60 mg  03/12/18 06:00  03/13/18 22:34





  Solu-Medrol -  IVPB   60 mg





  BID SHUHBAM   Administration


 


Montelukast Sodium  10 mg  03/13/18 22:00  03/13/18 21:38





  Singulair -  PO   10 mg





  HS SHUBHAM   Administration


 


Mupirocin  1 applic  03/12/18 10:00  03/13/18 21:38





  Bactroban Ointment (For Decolonization) -  NS  03/17/18 09:59  1 applic





  BID SHUBHAM   Administration





 Microbiology





03/12/18 05:20   Urine - Urine Clean Catch   Legionella Antigen - Final


03/12/18 05:20   Urine - Urine Clean Catch   Streptococcus pneumoniae Antigen (

M - Final





CXR 3/11 - Impression: No acute pathology. 





CTA chest 3/12 - 1. No evidence of pulmonary embolism. 2. Left upper lobe 

consolidation/atelectasis. 3. Right middle lobe nodule for which short-term CT 

follow-up is now recommended. Please see above discussion.





CXR 3/13 - Upper lobe atelectasis significantly improved. 





CXR 3/14 - unchanged. No active pumonary dz. 





ASSESSMENT/PLAN:


51 yo F with PMH of asthma (no prior admissions/intubations), who presented to 

ED w/ 4 day hx of cough, SOB and pleuritic chest pain. Continues to improve on 

bipap overnight, NC during AM. Wheezing, SOB improved. Still w/ productive cough





#Acute hypoxic respiratory failure 2/2 asthma exacerbation vs. PNA - CTA 

negative for PE/pneumothorax; WBC 17.8 ->  15.4 on steroids; Ddimer elevated at 

1345; SHANTANU atelectasis much improved today on cxr


- Iv solumed 60mg Q12H decreased to 40mg IV BID


- Duonebs prn


- No bronchoscopy per ICU team


- chest PT w/ deep suctioning


- rocephin/azithro for CAP coverage. Day 4


- trend WBC, fever curve


- BiPap for O2 support overnight. Wean O2 as tolerated. Maintain sat >92%


- IVFs


- IV tylenol for fever, pain control





#Constipation - no BM since admission


- Senna, colace


- monitor





#PPx:


-lovenox





FEN


D51/2 NS 42 cc


lytes wnl


Regular diet





dispo - transfer out of ICU





Plan discussed with attending, Dr. Annabella Bowers, PGY1











Visit type





- Emergency Visit


Emergency Visit: Yes


ED Registration Date: 03/12/18


Care time: The patient presented to the Emergency Department on the above date 

and was hospitalized for further evaluation of their emergent condition.





- New Patient


This patient is new to me today: No





- Critical Care


Critical Care patient: Yes


Total Critical Care Time (in minutes): 35


Critical Care Statement: The care of this patient involved high complexity 

decision making to prevent further life threatening deterioration of the patient

's condition and/or to evaluate & treat vital organ system(s) failure or risk 

of failure.

## 2018-03-14 NOTE — PN
Teaching Attending Note


Name of Resident: Emeterio Bowers





ATTENDING PHYSICIAN STATEMENT





I saw and evaluated the patient.


I reviewed the resident's note and discussed the case with the resident.


I agree with the resident's findings and plan as documented.








SUBJECTIVE: Patient says she is feeling better. 








OBJECTIVE:


 Vital Signs











 Period  Temp  Pulse  Resp  BP Sys/Herrera  Pulse Ox


 


 Last 24 Hr  97.8 F-99 F    18-22  /54-84  95-99








HEART: S1S2, tachycardic


LUNGS: Scattered rhonchi


ABDOMEN: Soft, non-tender, non-distended, normal BS


EXTREMITIES: No edema





 Laboratory Results - last 24 hr











  03/14/18 03/14/18





  05:25 05:25


 


WBC  15.4 H 


 


RBC  4.00 


 


Hgb  12.2 


 


Hct  36.6 


 


MCV  91.5 


 


MCH  30.6 


 


MCHC  33.4 


 


RDW  13.0 


 


Plt Count  211 


 


MPV  9.9 


 


Neutrophils %  93.5 H 


 


Lymphocytes %  4.0 L D 


 


Monocytes %  2.1 L 


 


Eosinophils %  0.1 


 


Basophils %  0.3 


 


Sodium   142


 


Potassium   4.6


 


Chloride   104


 


Carbon Dioxide   30


 


Anion Gap   8


 


BUN   14


 


Creatinine   0.8


 


Creat Clearance w eGFR   > 60


 


Random Glucose   129 H


 


Calcium   8.8


 


Phosphorus   3.7


 


Magnesium   2.5 H


 


Total Bilirubin   1.2 H


 


AST   13 L


 


ALT   23


 


Alkaline Phosphatase   65


 


Total Protein   6.3 L


 


Albumin   3.1 L








 Current Medications











Generic Name Dose Route Start Last Admin





  Trade Name Freq  PRN Reason Stop Dose Admin


 


Acetaminophen  1,000 mg  03/12/18 02:51  03/12/18 03:14





  Ofirmev Injection -  IVPB   1,000 mg





  Q6H PRN   Administration





  PAIN LEVEL 6-10   


 


Albuterol Sulfate  1 amp  03/12/18 12:31  





  Ventolin 0.083% Nebulizer Soln -  NEB   





  Q4H PRN   





  SHORT OF BREATH/WHEEZING   


 


Albuterol/Ipratropium  1 amp  03/12/18 08:00  03/14/18 15:59





  Duoneb -  NEB   1 amp





  Q4H SHUBHAM   Administration


 


Chlorhexidine Gluconate  1 applic  03/12/18 22:00  03/13/18 22:35





  Hibiclens For Decolonization -  TP   1 applic





  HS SHUBHAM   Administration


 


Docusate Sodium  100 mg  03/14/18 10:45  03/14/18 11:46





  Colace -  PO   100 mg





  BID SHUBHAM   Administration


 


Enoxaparin Sodium  40 mg  03/13/18 10:00  03/14/18 09:48





  Lovenox -  SQ   40 mg





  DAILY SHUBHAM   Administration


 


Azithromycin 500 mg/ Dextrose  250 mls @ 250 mls/hr  03/12/18 02:30  03/14/18 09

:48





  IVPB   250 mls/hr





  DAILY SHUBHAM   Administration


 


CEFTRIAXONE 1 G/50 ML PREMIX  50 mls @ 100 mls/hr  03/12/18 22:00  03/13/18 22:

34





  Ceftriaxone 1 Gm-D5w Bag  IVPB   100 mls/hr





  DAILY@2200 SHUBHAM   Administration


 


Methylprednisolone Sodium Succinate  40 mg  03/14/18 10:39  





  Solu-Medrol -  IVPB   





  BID SHUBHAM   


 


Montelukast Sodium  10 mg  03/13/18 22:00  03/13/18 21:38





  Singulair -  PO   10 mg





  HS SHUBHAM   Administration


 


Mupirocin  1 applic  03/12/18 10:00  03/14/18 09:50





  Bactroban Ointment (For Decolonization) -  NS  03/17/18 09:59  1 applic





  BID SHUBHAM   Administration


 


Senna  1 tab  03/14/18 10:45  03/14/18 11:46





  Senna -  PO   1 tab





  BID SHUBHAM   Administration














ASSESSMENT AND PLAN:


This is a 52 year old woman with a history of asthma who presented to the ED 

with SOB and cough.





1. Acute hypoxic respiratory failure secondary to acute asthma exacerbation 

with atelectasis


   - SoluMedrol being tapered


   - Continue Singulair, DuoNeb


   - Continue Rocephin, Zithromax for possible pneumonia


2. OK to transfer out of ICU

## 2018-03-14 NOTE — PN
Teaching Attending Note


Name of Resident: Aristides Guo





ATTENDING PHYSICIAN STATEMENT





I saw and evaluated the patient.


I reviewed the resident's note and discussed the case with the resident.


I agree with the resident's findings and plan as documented.








SUBJECTIVE:





Pt seen and examined in the ICU. Feels much improved today. Still with cough 

and wheezing. Not requiring BiPAP. Peak flow done at bedside 200.





OBJECTIVE:





 Last Vital Signs











Temp Pulse Resp BP Pulse Ox


 


 97.8 F   93 H  19   117/69   97 


 


 03/14/18 10:00  03/14/18 10:00  03/14/18 10:00  03/14/18 10:00  03/14/18 11:06








 Intake & Output











 03/11/18 03/12/18 03/13/18 03/14/18





 23:59 23:59 23:59 23:59


 


Intake Total  1555 1527 336


 


Output Total  1750  


 


Balance  -195 1527 336


 


Weight  81.363 kg 79.243 kg 80.876 kg








Gen:  NAD at rest


Heart: RRR


Lung: bilateral rhonchi, wheezes, better air movement


Abd: soft, nontender


Ext: no edema





 CBC, BMP





 03/14/18 05:25 





 03/14/18 05:25 





Active Medications





Acetaminophen (Ofirmev Injection -)  1,000 mg IVPB Q6H PRN


   PRN Reason: PAIN LEVEL 6-10


   Last Admin: 03/12/18 03:14 Dose:  1,000 mg


Albuterol Sulfate (Ventolin 0.083% Nebulizer Soln -)  1 amp NEB Q4H PRN


   PRN Reason: SHORT OF BREATH/WHEEZING


Albuterol/Ipratropium (Duoneb -)  1 amp NEB Q4H [facility]


   Last Admin: 03/14/18 08:10 Dose:  1 amp


Chlorhexidine Gluconate (Hibiclens For Decolonization -)  1 applic TP HS [facility]


   Last Admin: 03/13/18 22:35 Dose:  1 applic


Docusate Sodium (Colace -)  100 mg PO BID [facility]


Enoxaparin Sodium (Lovenox -)  40 mg SQ DAILY [facility]


   Last Admin: 03/14/18 09:48 Dose:  40 mg


Azithromycin 500 mg/ Dextrose  250 mls @ 250 mls/hr IVPB DAILY [facility]


   Last Admin: 03/14/18 09:48 Dose:  250 mls/hr


CEFTRIAXONE 1 G/50 ML PREMIX (Ceftriaxone 1 Gm-D5w Bag)  50 mls @ 100 mls/hr 

IVPB DAILY@2200 [facility]


   Last Admin: 03/13/18 22:34 Dose:  100 mls/hr


Methylprednisolone Sodium Succinate (Solu-Medrol -)  40 mg IVPB BID [facility]


Montelukast Sodium (Singulair -)  10 mg PO HS [facility]


   Last Admin: 03/13/18 21:38 Dose:  10 mg


Mupirocin (Bactroban Ointment (For Decolonization) -)  1 applic NS BID [facility]


   Stop: 03/17/18 09:59


   Last Admin: 03/14/18 09:50 Dose:  1 applic


Senna (Senna -)  1 tab PO BID [facility]








ASSESSMENT AND PLAN:


Acute Hypoxic Respiratory Failure


Acute Asthma Exacerbation


r/o Pneumonia


r/o Influenza


Atelectasis resolved





-  decrease medrol to 40mg q12h


-  if continues to improve, can likely change steroids in AM to PO prednisone 

40mg daily and taper as outpt


-  inhaled bronchodilators standing and PRN


-  complete empiric antibiotics


-  O2 to keep Spo2 >90%


-  BiPAP as needed 


-  monitor peak flow


-  singulair


-  PO as tolerated


-  DVT prophylaxis


-  can monitor on floor

## 2018-03-14 NOTE — MSN
Progress Note (SOAP)





- Subjective


Chief Complaint: 





Asthma Exacerbation


History of Present Illness: 





Pt is a 51 y/o female being treated for acute hypoxic repiratory failure 

secondary to asthma exacerbation. No acute events overnight. Patient is now 

able to tolerate nasal cannula. Pt reports decreased SOB with decreased cough. 

No longer has pleuritic retrosternal chest pain. She denies any fever, chills, 

nausea or vomiting.





- Current Medications


Current Medications: 


Active Medications





Acetaminophen (Ofirmev Injection -)  1,000 mg IVPB Q6H PRN


   PRN Reason: PAIN LEVEL 6-10


   Last Admin: 03/12/18 03:14 Dose:  1,000 mg


Albuterol Sulfate (Ventolin 0.083% Nebulizer Soln -)  1 amp NEB Q4H PRN


   PRN Reason: SHORT OF BREATH/WHEEZING


Albuterol/Ipratropium (Duoneb -)  1 amp NEB Q4H [facility]


   Last Admin: 03/14/18 04:30 Dose:  1 amp


Chlorhexidine Gluconate (Hibiclens For Decolonization -)  1 applic TP [facility]


   Last Admin: 03/13/18 22:35 Dose:  1 applic


Enoxaparin Sodium (Lovenox -)  40 mg SQ DAILY [facility]


   Last Admin: 03/13/18 10:45 Dose:  40 mg


Dextrose/Sodium Chloride (D5-1/2ns -)  1,000 mls @ 42 mls/hr IV ASDIR [facility]


   Last Admin: 03/13/18 10:45 Dose:  42 mls/hr


Azithromycin 500 mg/ Dextrose  250 mls @ 250 mls/hr IVPB DAILY [facility]


   Last Admin: 03/13/18 10:44 Dose:  250 mls/hr


CEFTRIAXONE 1 G/50 ML PREMIX (Ceftriaxone 1 Gm-D5w Bag)  50 mls @ 100 mls/hr 

IVPB DAILY@2200 [facility]


   Last Admin: 03/13/18 22:34 Dose:  100 mls/hr


Methylprednisolone Sodium Succinate (Solu-Medrol -)  60 mg IVPB BID [facility]


   Last Admin: 03/13/18 22:34 Dose:  60 mg


Montelukast Sodium (Singulair -)  10 mg PO [facility]


   Last Admin: 03/13/18 21:38 Dose:  10 mg


Mupirocin (Bactroban Ointment (For Decolonization) -)  1 applic NS BID [facility]


   Stop: 03/17/18 09:59


   Last Admin: 03/13/18 21:38 Dose:  1 applic











- Objective


Vital Signs: 


 Vital Signs











Temperature  98 F   03/14/18 02:00


 


Pulse Rate  79   03/14/18 06:00


 


Respiratory Rate  18   03/14/18 06:00


 


Blood Pressure  106/63   03/14/18 06:00


 


O2 Sat by Pulse Oximetry (%)  99   03/14/18 06:25











Constitutional: Yes: No Distress, Calm


HENT: Yes: Atraumatic, Normocephalic


Cardiovascular: Yes: Regular Rate and Rhythm


Respiratory: Yes: Cough, Wheezes


Gastrointestinal: Yes: Normal Bowel Sounds, Soft


Peripheral Pulses WNL: Yes


Peripheral Pulses: Left Radial: 2+, Right Radial: 2+, Left Doralis Pedis: 2+, 

Right Dorsalis Pedis: 2+


Edema: No


Neurological: Yes: Alert, Oriented





Labs


Lab Results: 


 CBC, BMP





 03/14/18 05:25 





 03/14/18 05:25 











Assessment/Plan





1) Acute hypoxic Respiratory Failure


* Secondary to asthma exacerbation vs pneumonia vs influenza


* Currently on IV Solumedrol 60 mg Q12


* Duonebs Q4H


* Improved breathing


* Serial ABGs


* Currently on nasal cannula and tolerating well


* Was on BiPap overnight


* Move out of ICU


2) Mucus Plug


* Seen on SHANTANU on CTA


* SHANTANU atelectasis improving on CXR


3) Influenza


* discontinue tamiflu due to low suspicion


4) Pneumonia


* Ceftriaxone/Azithromycin day 3

## 2018-03-14 NOTE — PN
Physical Exam: 


SUBJECTIVE: Patient seen and examined








No acute events overnight. Placed on bipap overnight and transitioned to NC 

this am, tolerating well. Pt reports decreased SOB and wheezing, but endorses 

persistent productive cough of brown/green thick sputum. Denies BM since 

admission. 





OBJECTIVE:





 Vital Signs











 Period  Temp  Pulse  Resp  BP Sys/Herrera  Pulse Ox


 


 Last 24 Hr  97.8 F-99 F    17-23  /54-84  95-99











GENERAL: middle aged female, awake, alert, and fully oriented, in no acute 

distress, on NC


HEENT: NC, AT


LUNGS: adequate air flow, occasional wheezing, no rales


HEART: regular rhythm, regular rhythm, no murmurs


ABDOMEN: Soft, nontender, nondistended, normoactive bowel sounds, no guarding, 

no 


rebound, no hepatosplenomegaly, no masses.


EXTREMITIES: 2+ pulses, warm, well-perfused, no edema. 


NEUROLOGICAL: Cranial nerves II through XII grossly intact. Normal speech, gait 

not 


observed.














 Laboratory Results - last 24 hr











  03/14/18 03/14/18





  05:25 05:25


 


WBC  15.4 H 


 


RBC  4.00 


 


Hgb  12.2 


 


Hct  36.6 


 


MCV  91.5 


 


MCH  30.6 


 


MCHC  33.4 


 


RDW  13.0 


 


Plt Count  211 


 


MPV  9.9 


 


Neutrophils %  93.5 H 


 


Lymphocytes %  4.0 L D 


 


Monocytes %  2.1 L 


 


Eosinophils %  0.1 


 


Basophils %  0.3 


 


Sodium   142


 


Potassium   4.6


 


Chloride   104


 


Carbon Dioxide   30


 


Anion Gap   8


 


BUN   14


 


Creatinine   0.8


 


Creat Clearance w eGFR   > 60


 


Random Glucose   129 H


 


Calcium   8.8


 


Phosphorus   3.7


 


Magnesium   2.5 H


 


Total Bilirubin   1.2 H


 


AST   13 L


 


ALT   23


 


Alkaline Phosphatase   65


 


Total Protein   6.3 L


 


Albumin   3.1 L








Active Medications











Generic Name Dose Route Start Last Admin





  Trade Name Freq  PRN Reason Stop Dose Admin


 


Acetaminophen  1,000 mg  03/12/18 02:51  03/12/18 03:14





  Ofirmev Injection -  IVPB   1,000 mg





  Q6H PRN   Administration





  PAIN LEVEL 6-10   


 


Albuterol Sulfate  1 amp  03/12/18 12:31  





  Ventolin 0.083% Nebulizer Soln -  NEB   





  Q4H PRN   





  SHORT OF BREATH/WHEEZING   


 


Albuterol/Ipratropium  1 amp  03/12/18 08:00  03/14/18 08:10





  Duoneb -  NEB   1 amp





  Q4H [facility]AM   Administration


 


Chlorhexidine Gluconate  1 applic  03/12/18 22:00  03/13/18 22:35





  Hibiclens For Decolonization -  TP   1 applic





  HS SHUBHAM   Administration


 


Docusate Sodium  100 mg  03/14/18 10:45  





  Colace -  PO   





  BID SH[facility]AM   


 


Enoxaparin Sodium  40 mg  03/13/18 10:00  03/14/18 09:48





  Lovenox -  SQ   40 mg





  DAILY [facility]   Administration


 


Azithromycin 500 mg/ Dextrose  250 mls @ 250 mls/hr  03/12/18 02:30  03/14/18 09

:48





  IVPB   250 mls/hr





  DAILY [facility]   Administration


 


CEFTRIAXONE 1 G/50 ML PREMIX  50 mls @ 100 mls/hr  03/12/18 22:00  03/13/18 22:

34





  Ceftriaxone 1 Gm-D5w Bag  IVPB   100 mls/hr





  DAILY@2200 [facility]   Administration


 


Methylprednisolone Sodium Succinate  40 mg  03/14/18 10:39  





  Solu-Medrol -  IVPB   





  BID SH[facility]HAM   


 


Montelukast Sodium  10 mg  03/13/18 22:00  03/13/18 21:38





  Singulair -  PO   10 mg





  HS SHUBHAM   Administration


 


Mupirocin  1 applic  03/12/18 10:00  03/14/18 09:50





  Bactroban Ointment (For Decolonization) -  NS  03/17/18 09:59  1 applic





  BID SHUB[facility]AM   Administration


 


Senna  1 tab  03/14/18 10:45  





  Senna -  PO   





  BID [facility]   











ASSESSMENT/PLAN:


52F w/ hx of asthma (no prior intubations) who presented with acute cough and 

SOB, admitted for acute hypoxic respiratory failure and acute asthma 

exacerbation. 





#Respiratory


   -acute hypoxic resp failure 2/2 PNA vs. asthma exacerbation: improving


   -CTA chest showed SHANTANU collapse with mild LLL mucoid impaction


   -continue ceftriaxone and azithromycin, day 4


   -taper solumedrol


   -continue duonebs standing and albuterol nebs PRN


   -wean O2 supplementation as tolerated


   -afebrile, leukocytosis of 15.4 


   -APAP for fever or pain


   -peak flow measurements


   -OOB to chair





#Cardiac


   -tachycardia of 


   -BPs wnl


   -mild troponinemia of 0.08, likely 2/2 demand ischemia. repeat trop of 0.03





#GI


   -constipation: start senna and colace





#FEN/ppx


   -po fluids


   -electrolytes wnl


   -regular diet


   -no GI ppx 


   -lovenox





#Dispo


   -transfer to med/surg





Case discussed with attending, Dr. Flores.





-Aristides Guo MD PGY1








Visit type





- Emergency Visit


Emergency Visit: Yes


ED Registration Date: 03/12/18


Care time: The patient presented to the Emergency Department on the above date 

and was hospitalized for further evaluation of their emergent condition.





- New Patient


This patient is new to me today: No





- Critical Care


Critical Care patient: Yes


Total Critical Care Time (in minutes): 36


Critical Care Statement: The care of this patient involved high complexity 

decision making to prevent further life threatening deterioration of the patient

's condition and/or to evaluate & treat vital organ system(s) failure or risk 

of failure.

## 2018-03-15 LAB
ALBUMIN SERPL-MCNC: 3.3 G/DL (ref 3.4–5)
ALP SERPL-CCNC: 67 U/L (ref 45–117)
ALT SERPL-CCNC: 34 U/L (ref 12–78)
ANION GAP SERPL CALC-SCNC: 10 MMOL/L (ref 8–16)
AST SERPL-CCNC: 15 U/L (ref 15–37)
BASOPHILS # BLD: 0.1 % (ref 0–2)
BILIRUB SERPL-MCNC: 1.1 MG/DL (ref 0.2–1)
BUN SERPL-MCNC: 16 MG/DL (ref 7–18)
CALCIUM SERPL-MCNC: 8.7 MG/DL (ref 8.5–10.1)
CHLORIDE SERPL-SCNC: 102 MMOL/L (ref 98–107)
CO2 SERPL-SCNC: 29 MMOL/L (ref 21–32)
CREAT SERPL-MCNC: 0.8 MG/DL (ref 0.55–1.02)
DEPRECATED RDW RBC AUTO: 12.8 % (ref 11.6–15.6)
EOSINOPHIL # BLD: 0 % (ref 0–4.5)
GLUCOSE SERPL-MCNC: 102 MG/DL (ref 74–106)
HCT VFR BLD CALC: 37.6 % (ref 32.4–45.2)
HGB BLD-MCNC: 12.5 GM/DL (ref 10.7–15.3)
LYMPHOCYTES # BLD: 6.3 % (ref 8–40)
MAGNESIUM SERPL-MCNC: 2.3 MG/DL (ref 1.8–2.4)
MCH RBC QN AUTO: 30.4 PG (ref 25.7–33.7)
MCHC RBC AUTO-ENTMCNC: 33.2 G/DL (ref 32–36)
MCV RBC: 91.5 FL (ref 80–96)
MONOCYTES # BLD AUTO: 4.4 % (ref 3.8–10.2)
NEUTROPHILS # BLD: 89.2 % (ref 42.8–82.8)
PHOSPHATE SERPL-MCNC: 4.3 MG/DL (ref 2.5–4.9)
PLATELET # BLD AUTO: 217 K/MM3 (ref 134–434)
PMV BLD: 10 FL (ref 7.5–11.1)
POTASSIUM SERPLBLD-SCNC: 4.3 MMOL/L (ref 3.5–5.1)
PROT SERPL-MCNC: 6.2 G/DL (ref 6.4–8.2)
RBC # BLD AUTO: 4.11 M/MM3 (ref 3.6–5.2)
SODIUM SERPL-SCNC: 141 MMOL/L (ref 136–145)
WBC # BLD AUTO: 15.2 K/MM3 (ref 4–10)

## 2018-03-15 RX ADMIN — MONTELUKAST SODIUM SCH MG: 10 TABLET, COATED ORAL at 22:15

## 2018-03-15 RX ADMIN — DOCUSATE SODIUM SCH MG: 100 CAPSULE, LIQUID FILLED ORAL at 22:15

## 2018-03-15 RX ADMIN — IPRATROPIUM BROMIDE AND ALBUTEROL SULFATE SCH AMP: .5; 3 SOLUTION RESPIRATORY (INHALATION) at 09:42

## 2018-03-15 RX ADMIN — ENOXAPARIN SODIUM SCH MG: 40 INJECTION SUBCUTANEOUS at 10:53

## 2018-03-15 RX ADMIN — IPRATROPIUM BROMIDE AND ALBUTEROL SULFATE SCH AMP: .5; 3 SOLUTION RESPIRATORY (INHALATION) at 13:34

## 2018-03-15 RX ADMIN — DOCUSATE SODIUM SCH MG: 100 CAPSULE, LIQUID FILLED ORAL at 10:52

## 2018-03-15 RX ADMIN — METHYLPREDNISOLONE SODIUM SUCCINATE SCH MG: 40 INJECTION, POWDER, FOR SOLUTION INTRAMUSCULAR; INTRAVENOUS at 10:53

## 2018-03-15 RX ADMIN — POLYETHYLENE GLYCOL 3350 SCH GM: 17 POWDER, FOR SOLUTION ORAL at 10:54

## 2018-03-15 RX ADMIN — SENNOSIDES SCH TAB: 8.6 TABLET, FILM COATED ORAL at 22:15

## 2018-03-15 RX ADMIN — ACETAMINOPHEN PRN MG: 325 TABLET ORAL at 12:22

## 2018-03-15 RX ADMIN — SENNOSIDES SCH TAB: 8.6 TABLET, FILM COATED ORAL at 10:51

## 2018-03-15 RX ADMIN — METHYLPREDNISOLONE SODIUM SUCCINATE SCH MG: 40 INJECTION, POWDER, FOR SOLUTION INTRAMUSCULAR; INTRAVENOUS at 22:15

## 2018-03-15 RX ADMIN — IPRATROPIUM BROMIDE AND ALBUTEROL SULFATE SCH: .5; 3 SOLUTION RESPIRATORY (INHALATION) at 02:10

## 2018-03-15 RX ADMIN — IPRATROPIUM BROMIDE AND ALBUTEROL SULFATE SCH AMP: .5; 3 SOLUTION RESPIRATORY (INHALATION) at 06:05

## 2018-03-15 RX ADMIN — IPRATROPIUM BROMIDE AND ALBUTEROL SULFATE SCH AMP: .5; 3 SOLUTION RESPIRATORY (INHALATION) at 20:35

## 2018-03-15 RX ADMIN — AZITHROMYCIN DIHYDRATE SCH MLS/HR: 500 INJECTION, POWDER, LYOPHILIZED, FOR SOLUTION INTRAVENOUS at 10:53

## 2018-03-15 NOTE — PN
Progress Note (short form)





- Note


Progress Note: 


Overall feeling better.  Some residual cough/wheezing. 


No acute events overnight. 


Did not use NIPPV overnight. 





 Intake & Output











 03/12/18 03/13/18 03/14/18 03/15/18





 23:59 23:59 23:59 23:59


 


Intake Total 1555 1527 1096 200


 


Output Total 1750   


 


Balance -195 1527 1096 200


 


Weight 179 lb 6 oz 174 lb 11.2 oz 178 lb 4.8 oz 








 Last Vital Signs











Temp Pulse Resp BP Pulse Ox


 


 97.9 F   76   20   104/60   97 


 


 03/15/18 08:55  03/15/18 08:55  03/15/18 08:55  03/15/18 08:55  03/15/18 07:31








Active Medications





Acetaminophen (Tylenol -)  650 mg PO Q6H PRN


   PRN Reason: PAIN LEVEL 6-10


   Last Admin: 03/15/18 12:22 Dose:  650 mg


Albuterol Sulfate (Ventolin 0.083% Nebulizer Soln -)  1 amp NEB Q4H PRN


   PRN Reason: SHORT OF BREATH/WHEEZING


Albuterol/Ipratropium (Duoneb -)  1 amp NEB Q4HPO [facility]


   Last Admin: 03/15/18 09:42 Dose:  1 amp


Docusate Sodium (Colace -)  100 mg PO BID [facility]


   Last Admin: 03/15/18 10:52 Dose:  100 mg


Enoxaparin Sodium (Lovenox -)  40 mg SQ DAILY [facility]


   Last Admin: 03/15/18 10:53 Dose:  40 mg


Azithromycin 500 mg/ Dextrose  250 mls @ 250 mls/hr IVPB DAILY [facility]


   Last Admin: 03/15/18 10:53 Dose:  250 mls/hr


Ceftriaxone Sodium 1 gm/ (Dextrose)  50 mls @ 100 mls/hr IVPB DAILY@2200 [facility]


Methylprednisolone Sodium Succinate (Solu-Medrol -)  40 mg IVPUSH BID [facility]


   Last Admin: 03/15/18 10:53 Dose:  40 mg


Montelukast Sodium (Singulair -)  10 mg PO HS [facility]


   Last Admin: 03/14/18 21:35 Dose:  10 mg


Polyethylene Glycol (Miralax (For Daily Use) -)  17 gm PO DAILY [facility]


   Last Admin: 03/15/18 10:54 Dose:  17 gm


Senna (Senna -)  1 tab PO BID [facility]


   Last Admin: 03/15/18 10:51 Dose:  1 tab








Gen:  NAD at rest


Heart: RRR


Lung: scattered bilateral rhonchi / expiratory wheezes 


Abd: soft, nontender


Ext: no edema





 


 Laboratory Results - last 24 hr











  03/15/18 03/15/18





  06:05 06:05


 


WBC  15.2 H 


 


RBC  4.11 


 


Hgb  12.5 


 


Hct  37.6 


 


MCV  91.5 


 


MCH  30.4 


 


MCHC  33.2 


 


RDW  12.8 


 


Plt Count  217 


 


MPV  10.0 


 


Neutrophils %  89.2 H 


 


Lymphocytes %  6.3 L D 


 


Monocytes %  4.4  D 


 


Eosinophils %  0.0  D 


 


Basophils %  0.1 


 


Sodium   141


 


Potassium   4.3


 


Chloride   102


 


Carbon Dioxide   29


 


Anion Gap   10


 


BUN   16


 


Creatinine   0.8


 


Creat Clearance w eGFR   > 60


 


Random Glucose   102


 


Calcium   8.7


 


Phosphorus   4.3


 


Magnesium   2.3


 


Total Bilirubin   1.1 H


 


AST   15


 


ALT   34


 


Alkaline Phosphatase   67


 


Total Protein   6.2 L


 


Albumin   3.3 L














ASSESSMENT AND PLAN:


Acute Hypoxic Respiratory Failure


Acute Asthma Exacerbation


r/o Pneumonia


r/o Influenza


Atelectasis resolved





-  Medrol 40mg q12h, can likely change to Presdnisone in the AM if stable 


-  inhaled bronchodilators standing and PRN


-  complete empiric antibiotics


-  O2 to keep Spo2 >90%


-  monitor peak flow


-  singulair


-  PO as tolerated


-  DVT prophylaxis


-  Possible D/C tomorrow 





Dr Cheng

## 2018-03-15 NOTE — PN
Physical Exam: 


SUBJECTIVE: Patient seen and examined 





- no major events. Tolerated NC overnight. No fever, tachycardia. Ambulating to 

bathroom. Endorses continual minimally productive cough, chest congestion w/ 

mild CP and occasional SOB during cough fits; Denies f/c/n/v/d; no BM since 

admission; Poor appetite








OBJECTIVE:





 Vital Signs


 Intake & Output











 03/12/18 03/13/18 03/14/18 03/15/18





 23:59 23:59 23:59 23:59


 


Intake Total 1555 1527 1096 200


 


Output Total 1750   


 


Balance -195 1527 1096 200


 


Weight 81.363 kg 79.243 kg 80.876 kg 

















 Period  Temp  Pulse  Resp  BP Sys/Herrera  Pulse Ox


 


 Last 24 Hr  97.5 F-98.8 F  76-97  18-21  105-139/62-72  95-98











GENERAL: A&Ox3. NAD. 


HEAD: Normal with no signs of trauma.


EYES: PERRL, extraocular movements intact, sclera anicteric, conjunctiva clear. 

No ptosis. 


ENT: Ears normal, nares patent, oropharynx clear without exudates, moist mucous 

membranes.


NECK: Trachea midline, full range of motion, supple. 


LUNGS: Still w/ upper airway congestion. Diffuse BL mild Ex wheezing in all 

lung fields. moderate air entry. Mild bibasilar rhonchi. No accessory muscle 

use.  


HEART: Regular rate and rhythm, S1, S2 without murmur, rub or gallop.


ABDOMEN: Mild discomfort to deep palpation; Soft, nontender, nondistended, 

hypoactive bowel sounds, no guarding, no rebound, no hepatosplenomegaly, no 

masses.


EXTREMITIES: 2+ DP/PT pulses, warm, well-perfused, no edema. 


NEUROLOGICAL: Cranial nerves II through XII grossly intact. Normal speech, gait 

not observed. 5/5 strength and preserved sensation to light touch in all 

extremities. 





 CBC, BMP





 03/15/18 06:05 





 03/15/18 06:05 











Active Medications











Generic Name Dose Route Start Last Admin





  Trade Name Freq  PRN Reason Stop Dose Admin


 


Acetaminophen  650 mg  03/14/18 19:53  





  Tylenol -  PO   





  Q6H PRN   





  PAIN LEVEL 6-10   


 


Albuterol Sulfate  1 amp  03/14/18 19:53  





  Ventolin 0.083% Nebulizer Soln -  NEB   





  Q4H PRN   





  SHORT OF BREATH/WHEEZING   


 


Albuterol/Ipratropium  1 amp  03/14/18 22:00  03/14/18 22:54





  Duoneb -  NEB   1 amp





  Q4HPO [facility]   Administration


 


Docusate Sodium  100 mg  03/14/18 22:00  03/14/18 21:35





  Colace -  PO   100 mg





  BID [facility]   Administration


 


Enoxaparin Sodium  40 mg  03/15/18 10:00  





  Lovenox -  SQ   





  DAILY [facility]   


 


Azithromycin 500 mg/ Dextrose  250 mls @ 250 mls/hr  03/15/18 10:00  





  IVPB   





  DAILY [facility]   


 


Ceftriaxone Sodium 1 gm/  50 mls @ 100 mls/hr  03/14/18 22:00  03/14/18 21:36





  Dextrose  IVPB   100 mls/hr





  DAILY@2200 [facility]   Administration


 


Methylprednisolone Sodium Succinate  40 mg  03/14/18 22:00  03/14/18 21:38





  Solu-Medrol -  IVPUSH   40 mg





  BID [facility]   Administration


 


Montelukast Sodium  10 mg  03/14/18 22:00  03/14/18 21:35





  Singulair -  PO   10 mg





  HS SHUBHAM   Administration


 


Senna  1 tab  03/14/18 22:00  03/14/18 21:35





  Senna -  PO   1 tab





  BID [facility]   Administration





 Microbiology





03/12/18 05:20   Urine - Urine Clean Catch   Legionella Antigen - Final


03/12/18 05:20   Urine - Urine Clean Catch   Streptococcus pneumoniae Antigen (

M - Final





CXR 3/11 - Impression: No acute pathology. 





CTA chest 3/12 - 1. No evidence of pulmonary embolism. 2. Left upper lobe 

consolidation/atelectasis. 3. Right middle lobe nodule for which short-term CT 

follow-up is now recommended. Please see above discussion.





CXR 3/13 - Upper lobe atelectasis significantly improved. 





CXR 3/14 - unchanged. No active pumonary dz. 





ASSESSMENT/PLAN:


53 yo F with PMH of asthma (no prior admissions/intubations), who presented to 

ED w/ 4 day hx of cough, SOB and pleuritic chest pain.  Continues to improve, 

WBC normalizing, still w/ cough, chest congestion. 





#Acute hypoxic respiratory failure 2/2 asthma exacerbation vs. PNA - CTA 

negative for PE/pneumothorax; WBC 15.4 -> 15.2 on steroids; Ddimer elevated at 

1345


- Iv solumed 40mg IV BID


- Duonebs prn, singulair


- No bronchoscopy per ICU team


- chest PT w/ deep suctioning


- rocephin/azithro for CAP coverage. Day 5


- trend WBC, fever curve


- BiPap for O2 support overnight. Wean O2 as tolerated. Maintain sat >92%


- IVFs


- IV tylenol for fever, pain control


- Pulm consulted, recs appreciated -> switch to PO prednisone tomorrow





#Constipation - no BM since admission


- Senna, colace; miralax added today


- monitor





#PPx:


-lovenox





FEN


D51/2 NS 42 cc. Switch to oral hydration tomorrow


lytes wnl


Regular diet





dispo - m/s. Possible discharge in next day or two if continue to improve 

clinically





Plan discussed with attending, Dr. Annabella Bowers, PGY1





Visit type





- Emergency Visit


Emergency Visit: Yes


ED Registration Date: 03/12/18


Care time: The patient presented to the Emergency Department on the above date 

and was hospitalized for further evaluation of their emergent condition.





- New Patient


This patient is new to me today: No





- Critical Care


Critical Care patient: No

## 2018-03-15 NOTE — PN
Teaching Attending Note


Name of Resident: Emeterio Bowers





ATTENDING PHYSICIAN STATEMENT





I saw and evaluated the patient.


I reviewed the resident's note and discussed the case with the resident.


I agree with the resident's findings and plan as documented.








SUBJECTIVE: Patient has no complaints.








OBJECTIVE:


 Vital Signs











 Period  Temp  Pulse  Resp  BP Sys/Herrera  Pulse Ox


 


 Last 24 Hr  97.5 F-98.8 F  71-97  18-20  101-139/60-72  97-98











HEART: S1S2, RRR


LUNGS: Bilateral rhonchi and wheezes


ABDOMEN: Soft, non-tender, non-distended, normal BS


EXTREMITIES: No edema





 Laboratory Results - last 24 hr











  03/15/18 03/15/18





  06:05 06:05


 


WBC  15.2 H 


 


RBC  4.11 


 


Hgb  12.5 


 


Hct  37.6 


 


MCV  91.5 


 


MCH  30.4 


 


MCHC  33.2 


 


RDW  12.8 


 


Plt Count  217 


 


MPV  10.0 


 


Neutrophils %  89.2 H 


 


Lymphocytes %  6.3 L D 


 


Monocytes %  4.4  D 


 


Eosinophils %  0.0  D 


 


Basophils %  0.1 


 


Sodium   141


 


Potassium   4.3


 


Chloride   102


 


Carbon Dioxide   29


 


Anion Gap   10


 


BUN   16


 


Creatinine   0.8


 


Creat Clearance w eGFR   > 60


 


Random Glucose   102


 


Calcium   8.7


 


Phosphorus   4.3


 


Magnesium   2.3


 


Total Bilirubin   1.1 H


 


AST   15


 


ALT   34


 


Alkaline Phosphatase   67


 


Total Protein   6.2 L


 


Albumin   3.3 L











 Current Medications











Generic Name Dose Route Start Last Admin





  Trade Name Freq  PRN Reason Stop Dose Admin


 


Acetaminophen  650 mg  03/14/18 19:53  03/15/18 12:22





  Tylenol -  PO   650 mg





  Q6H PRN   Administration





  PAIN LEVEL 6-10   


 


Albuterol Sulfate  1 amp  03/14/18 19:53  





  Ventolin 0.083% Nebulizer Soln -  NEB   





  Q4H PRN   





  SHORT OF BREATH/WHEEZING   


 


Albuterol/Ipratropium  1 amp  03/14/18 22:00  03/15/18 13:34





  Duoneb -  NEB   1 amp





  Q4HPO [facility]   Administration


 


Docusate Sodium  100 mg  03/14/18 22:00  03/15/18 10:52





  Colace -  PO   100 mg





  BID [facility]   Administration


 


Enoxaparin Sodium  40 mg  03/15/18 10:00  03/15/18 10:53





  Lovenox -  SQ   40 mg





  DAILY [facility]   Administration


 


Azithromycin 500 mg/ Dextrose  250 mls @ 250 mls/hr  03/15/18 10:00  03/15/18 10

:53





  IVPB   250 mls/hr





  DAILY [facility]   Administration


 


Ceftriaxone Sodium 1 gm/  50 mls @ 100 mls/hr  03/15/18 08:17  





  Dextrose  IVPB   





  DAILY@2200 [facility]   


 


Methylprednisolone Sodium Succinate  40 mg  03/14/18 22:00  03/15/18 10:53





  Solu-Medrol -  IVPUSH   40 mg





  BID [facility]   Administration


 


Montelukast Sodium  10 mg  03/14/18 22:00  03/14/18 21:35





  Singulair -  PO   10 mg





  HS [facility]   Administration


 


Polyethylene Glycol  17 gm  03/15/18 10:15  03/15/18 10:54





  Miralax (For Daily Use) -  PO   17 gm





  DAILY [facility]   Administration


 


Senna  1 tab  03/14/18 22:00  03/15/18 10:51





  Senna -  PO   1 tab





  BID [facility]   Administration








ASSESSMENT AND PLAN:


This is a 52 year old woman with a history of asthma who presented to the ED 

with SOB and cough.





1. Acute hypoxic respiratory failure secondary to acute asthma exacerbation 

with atelectasis


   - Continue SoluMedrol at same dose


   - Continue Singulair, DuoNeb


   - Continue Rocephin, Zithromax for possible pneumonia


   - Atelectasis resolved

## 2018-03-15 NOTE — MSN
Progress Note (SOAP)





- Subjective


Chief Complaint: 





Asthma Exacerbation


History of Present Illness: 





Pt is a 51 y/o female being treated for acute hypoxic repiratory failure 

secondary to asthma exacerbation. No acute events overnight. Patient complains 

of persistent cough overnight but denies any production of sputum. Patient 

states that she is tired due to being kept awake by the cough. Patient also 

endorses a headache while coughing. She does not have the pleuritic chest pain 

that she was having before. Patient denies any fever, chills, nausea or 

vomiting.





- Current Medications


Current Medications: 


Active Medications





Acetaminophen (Tylenol -)  650 mg PO Q6H PRN


   PRN Reason: PAIN LEVEL 6-10


Albuterol Sulfate (Ventolin 0.083% Nebulizer Soln -)  1 amp NEB Q4H PRN


   PRN Reason: SHORT OF BREATH/WHEEZING


Albuterol/Ipratropium (Duoneb -)  1 amp NEB Q4HPO [facility]


   Last Admin: 03/15/18 06:05 Dose:  1 amp


Docusate Sodium (Colace -)  100 mg PO BID [facility]


   Last Admin: 03/14/18 21:35 Dose:  100 mg


Enoxaparin Sodium (Lovenox -)  40 mg SQ DAILY [facility]


Azithromycin 500 mg/ Dextrose  250 mls @ 250 mls/hr IVPB DAILY [facility]


Ceftriaxone Sodium 1 gm/ (Dextrose)  50 mls @ 100 mls/hr IVPB DAILY@2200 [facility]


   Last Admin: 03/14/18 21:36 Dose:  100 mls/hr


Methylprednisolone Sodium Succinate (Solu-Medrol -)  40 mg IVPUSH BID [facility]


   Last Admin: 03/14/18 21:38 Dose:  40 mg


Montelukast Sodium (Singulair -)  10 mg PO HS [facility]


   Last Admin: 03/14/18 21:35 Dose:  10 mg


Senna (Senna -)  1 tab PO BID [facility]


   Last Admin: 03/14/18 21:35 Dose:  1 tab











- Objective


Vital Signs: 


 Vital Signs











Temperature  97.9 F   03/15/18 06:00


 


Pulse Rate  82   03/15/18 07:31


 


Respiratory Rate  18   03/15/18 06:00


 


Blood Pressure  101/60   03/15/18 06:00


 


O2 Sat by Pulse Oximetry (%)  97   03/15/18 07:31











Constitutional: Yes: Well Nourished, No Distress, Calm


HENT: Yes: Atraumatic, Normocephalic


Cardiovascular: Yes: Tachycardia


Respiratory: Yes: Wheezes


Peripheral Pulses WNL: Yes


Edema: No


Neurological: Yes: Alert, Oriented


Psychiatric: Yes: Alert, Oriented





Labs


Lab Results: 


 CBC, BMP





 03/15/18 06:05 











Assessment/Plan





1) Acute hypoxic Respiratory Failure


* Secondary to asthma exacerbation vs pneumonia vs influenza


* Currently on IV Solumedrol 60 mg Q12, switch to oral prednisone in AM per 

pulmonology recommendations


* Duonebs Q4H


* Improved breathing


* Serial ABGs


* Currently on nasal cannula and tolerating well


* Monitor on floor


2) Mucus Plug


* Seen on SHANTANU on CTA


* SHANTANU atelectasis improving on CXR


3) Influenza


* discontinue tamiflu due to low suspicion


4) Pneumonia


* Ceftriaxone/Azithromycin day 4

## 2018-03-16 LAB
ALBUMIN SERPL-MCNC: 3.3 G/DL (ref 3.4–5)
ALP SERPL-CCNC: 65 U/L (ref 45–117)
ALT SERPL-CCNC: 40 U/L (ref 12–78)
ANION GAP SERPL CALC-SCNC: 11 MMOL/L (ref 8–16)
ANISOCYTOSIS BLD QL: (no result)
AST SERPL-CCNC: 12 U/L (ref 15–37)
BILIRUB SERPL-MCNC: 1 MG/DL (ref 0.2–1)
BUN SERPL-MCNC: 15 MG/DL (ref 7–18)
CALCIUM SERPL-MCNC: 8.6 MG/DL (ref 8.5–10.1)
CHLORIDE SERPL-SCNC: 101 MMOL/L (ref 98–107)
CO2 SERPL-SCNC: 27 MMOL/L (ref 21–32)
CREAT SERPL-MCNC: 0.8 MG/DL (ref 0.55–1.02)
DEPRECATED RDW RBC AUTO: 13 % (ref 11.6–15.6)
GLUCOSE SERPL-MCNC: 103 MG/DL (ref 74–106)
HCT VFR BLD CALC: 39.3 % (ref 32.4–45.2)
HGB BLD-MCNC: 12.9 GM/DL (ref 10.7–15.3)
MACROCYTES BLD QL: (no result)
MCH RBC QN AUTO: 30 PG (ref 25.7–33.7)
MCHC RBC AUTO-ENTMCNC: 32.7 G/DL (ref 32–36)
MCV RBC: 91.6 FL (ref 80–96)
PLATELET # BLD AUTO: 212 K/MM3 (ref 134–434)
PLATELET BLD QL SMEAR: NORMAL
PMV BLD: 9.4 FL (ref 7.5–11.1)
POTASSIUM SERPLBLD-SCNC: 4.2 MMOL/L (ref 3.5–5.1)
PROT SERPL-MCNC: 6.4 G/DL (ref 6.4–8.2)
RBC # BLD AUTO: 4.29 M/MM3 (ref 3.6–5.2)
SODIUM SERPL-SCNC: 139 MMOL/L (ref 136–145)
WBC # BLD AUTO: 10.6 K/MM3 (ref 4–10)

## 2018-03-16 RX ADMIN — IPRATROPIUM BROMIDE AND ALBUTEROL SULFATE SCH AMP: .5; 3 SOLUTION RESPIRATORY (INHALATION) at 08:21

## 2018-03-16 RX ADMIN — IPRATROPIUM BROMIDE AND ALBUTEROL SULFATE SCH AMP: .5; 3 SOLUTION RESPIRATORY (INHALATION) at 20:40

## 2018-03-16 RX ADMIN — DOCUSATE SODIUM SCH MG: 100 CAPSULE, LIQUID FILLED ORAL at 23:13

## 2018-03-16 RX ADMIN — IPRATROPIUM BROMIDE AND ALBUTEROL SULFATE SCH AMP: .5; 3 SOLUTION RESPIRATORY (INHALATION) at 00:17

## 2018-03-16 RX ADMIN — METHYLPREDNISOLONE SODIUM SUCCINATE SCH MG: 40 INJECTION, POWDER, FOR SOLUTION INTRAMUSCULAR; INTRAVENOUS at 18:11

## 2018-03-16 RX ADMIN — SENNOSIDES SCH TAB: 8.6 TABLET, FILM COATED ORAL at 09:44

## 2018-03-16 RX ADMIN — IPRATROPIUM BROMIDE AND ALBUTEROL SULFATE SCH AMP: .5; 3 SOLUTION RESPIRATORY (INHALATION) at 15:46

## 2018-03-16 RX ADMIN — IPRATROPIUM BROMIDE AND ALBUTEROL SULFATE SCH AMP: .5; 3 SOLUTION RESPIRATORY (INHALATION) at 04:50

## 2018-03-16 RX ADMIN — METHYLPREDNISOLONE SODIUM SUCCINATE SCH MG: 40 INJECTION, POWDER, FOR SOLUTION INTRAMUSCULAR; INTRAVENOUS at 09:44

## 2018-03-16 RX ADMIN — MONTELUKAST SODIUM SCH MG: 10 TABLET, COATED ORAL at 23:13

## 2018-03-16 RX ADMIN — DOCUSATE SODIUM SCH MG: 100 CAPSULE, LIQUID FILLED ORAL at 09:44

## 2018-03-16 RX ADMIN — ENOXAPARIN SODIUM SCH MG: 40 INJECTION SUBCUTANEOUS at 09:44

## 2018-03-16 RX ADMIN — POLYETHYLENE GLYCOL 3350 SCH GM: 17 POWDER, FOR SOLUTION ORAL at 09:45

## 2018-03-16 RX ADMIN — ACETAMINOPHEN PRN MG: 325 TABLET ORAL at 09:44

## 2018-03-16 RX ADMIN — SENNOSIDES SCH TAB: 8.6 TABLET, FILM COATED ORAL at 23:13

## 2018-03-16 RX ADMIN — AZITHROMYCIN DIHYDRATE SCH MLS/HR: 500 INJECTION, POWDER, LYOPHILIZED, FOR SOLUTION INTRAVENOUS at 09:43

## 2018-03-16 RX ADMIN — IPRATROPIUM BROMIDE AND ALBUTEROL SULFATE SCH AMP: .5; 3 SOLUTION RESPIRATORY (INHALATION) at 11:01

## 2018-03-16 NOTE — PN
Progress Note (short form)





- Note


Progress Note: 





PULMONARY





HARSH COUGH/SCANT SPUTUM


WHEEZES CONTINUE





VSS/AFEBR


ANICTERIC


B/L DIFFUSE WHEEZES AND RHONCHI


S1S2


BS+


NO EDEMA





LABS/MICRO/MEDS/NOTES/IMAGES REVIEWED





ACUTE ASTHMATIC BRONCHITIS


ACUTE HYPOXEMIC RESP FAILURE RESOLVING





CHANGE BACK TO IV STEROIDS


CONTINUE O2/BRONCHODILATORS/SINGULAIR/DAILY PEAK FLOW





SUZIE HUGGINS MD

## 2018-03-16 NOTE — PN
Teaching Attending Note


Name of Resident: Emeterio Bowers





ATTENDING PHYSICIAN STATEMENT





I saw and evaluated the patient.


I reviewed the resident's note and discussed the case with the resident.


I agree with the resident's findings and plan as documented with exceptions 

mentioned below.








SUBJECTIVE:


Patient seen and examined, Breathing overall improved, still with cough and 

weakness on ambulation. no fevers/chills or new symptoms. 





OBJECTIVE:


 Vital Signs











 Period  Temp  Pulse  Resp  BP Sys/Herrera  Pulse Ox


 


 Last 24 Hr  97.9 F-98.9 F  83-91  16-20  /52-70  96-97








 Intake & Output











 03/13/18 03/14/18 03/15/18 03/16/18





 23:59 23:59 23:59 23:59


 


Intake Total 1527 1096 900 700


 


Output Total    500


 


Balance 1527 1096 900 200


 


Weight 174 lb 11.2 oz 178 lb 4.8 oz  








General: sitting in bed in no acute distress


Chest: good air entry, no wheezing appreciated





 Home Medication List











 Medication  Instructions  Recorded  Confirmed  Type


 


NK [No Known Home Medication]  03/11/18 03/11/18 History








 Active Medications











Generic Name Dose Route Start Last Admin





  Trade Name Freq  PRN Reason Stop Dose Admin


 


Acetaminophen  650 mg  03/14/18 19:53  03/16/18 09:44





  Tylenol -  PO   650 mg





  Q6H PRN   Administration





  PAIN LEVEL 6-10   


 


Albuterol Sulfate  1 amp  03/14/18 19:53  





  Ventolin 0.083% Nebulizer Soln -  NEB   





  Q4H PRN   





  SHORT OF BREATH/WHEEZING   


 


Albuterol/Ipratropium  1 amp  03/15/18 00:00  03/16/18 15:46





  Duoneb -  NEB   1 amp





  Q4H [facility]   Administration


 


Docusate Sodium  100 mg  03/14/18 22:00  03/16/18 09:44





  Colace -  PO   100 mg





  BID [facility]   Administration


 


Enoxaparin Sodium  40 mg  03/15/18 10:00  03/16/18 09:44





  Lovenox -  SQ   40 mg





  DAILY [facility]   Administration


 


Azithromycin 500 mg/ Dextrose  250 mls @ 250 mls/hr  03/15/18 10:00  03/16/18 09

:43





  IVPB   250 mls/hr





  DAILY [facility]   Administration


 


Methylprednisolone Sodium Succinate  40 mg  03/16/18 18:00  





  Solu-Medrol -  IVPUSH   





  Q8H-IV [facility]   


 


Montelukast Sodium  10 mg  03/14/18 22:00  03/15/18 22:15





  Singulair -  PO   10 mg





  HS [facility]   Administration


 


Polyethylene Glycol  17 gm  03/15/18 10:15  03/16/18 09:45





  Miralax (For Daily Use) -  PO   17 gm





  DAILY [facility]   Administration


 


Senna  1 tab  03/14/18 22:00  03/16/18 09:44





  Senna -  PO   1 tab





  BID [facility]   Administration








 Laboratory Results - last 24 hr











  03/16/18 03/16/18





  07:00 07:00


 


WBC  10.6 H D 


 


RBC  4.29 


 


Hgb  12.9 


 


Hct  39.3 


 


MCV  91.6 


 


MCH  30.0 


 


MCHC  32.7 


 


RDW  13.0 


 


Plt Count  212 


 


MPV  9.4 


 


Neutrophils %  No Result Required. 


 


Neutrophils % (Manual)  84.8 H 


 


Band Neutrophils %  0.0 


 


Lymphocytes %  No Result Required. 


 


Lymphocytes % (Manual)  5.1 L 


 


Monocytes % (Manual)  6 


 


Eosinophils % (Manual)  0.0 


 


Basophils % (Manual)  0.0 


 


Myelocytes % (Man)  2 


 


Promyelocytes % (Man)  0 


 


Blast Cells % (Manual)  0 


 


Nucleated RBC %  0 


 


Metamyelocytes  0 


 


Hypochromia  0 


 


Platelet Estimate  Normal 


 


Polychromasia  0 


 


Poikilocytosis  0 


 


Anisocytosis  1+ 


 


Microcytosis  0 


 


Macrocytosis  1+ 


 


Sodium   139


 


Potassium   4.2


 


Chloride   101


 


Carbon Dioxide   27


 


Anion Gap   11


 


BUN   15


 


Creatinine   0.8


 


Creat Clearance w eGFR   > 60


 


Random Glucose   103


 


Calcium   8.6


 


Total Bilirubin   1.0


 


AST   12 L


 


ALT   40


 


Alkaline Phosphatase   65


 


Total Protein   6.4


 


Albumin   3.3 L








 Microbiology





03/12/18 05:20   Urine - Urine Clean Catch   Legionella Antigen - Final


03/12/18 05:20   Urine - Urine Clean Catch   Streptococcus pneumoniae Antigen (

M - Final











ASSESSMENT AND PLAN:


52 yof with acute asthma exacerbation and SHANTANU PNA vs atelectasis





-Acute asthma exacerbation


-Acute hypoxic respiratory insufficiency


-SHANTANU CAP vs atelectasis





Plan:


Pulmonary input noted, 


Ceftriaxone/azithromycin day 5. WBC normalized.


IV steroids, taper over 24-48  hours. 


Pre and post ambulatory oxygen needs assessment. 


nebs, taper oxygen as tolerated.


Dispo planning in 24-48 hours as improves.


Plan discussed with patient in detail, all questions answered.

## 2018-03-16 NOTE — PN
Physical Exam: 


SUBJECTIVE: Patient seen and examined





- no events; improving clinically. borderline fever overnigtht; still with cough

, CP, sob during paroxysms; slowly improving; 1 BM overnight


- Pt walked twice, states she became nauseous, SOB; per pulm recommend 

continuation of IV steroids before d/c home





OBJECTIVE:





 Vital Signs


 Intake & Output











 03/13/18 03/14/18 03/15/18 03/16/18





 23:59 23:59 23:59 23:59


 


Intake Total 1527 1096 900 


 


Balance 1527 1096 900 


 


Weight 79.243 kg 80.876 kg  

















 Period  Temp  Pulse  Resp  BP Sys/Herrera  Pulse Ox


 


 Last 24 Hr  97.7 F-99.9 F  76-96  18-20  102-119/53-70  96-98











GENERAL: A&Ox3. NAD. 


HEAD: Normal with no signs of trauma.


EYES: PERRL, extraocular movements intact, sclera anicteric, conjunctiva clear. 

No ptosis. 


ENT: Ears normal, nares patent, oropharynx clear without exudates, moist mucous 

membranes.


NECK: Trachea midline, full range of motion, supple. 


LUNGS: mild upper airway congestion. trace wheezing, R>L. moderate air entry. 

No accessory muscle use.  


HEART: Regular rate and rhythm, S1, S2 without murmur, rub or gallop.


ABDOMEN: Soft, nontender, nondistended, hypoactive bowel sounds, no guarding, 

no rebound, no hepatosplenomegaly, no masses.


EXTREMITIES: 2+ DP/PT pulses, warm, well-perfused, no edema. 


NEUROLOGICAL: Cranial nerves II through XII grossly intact. Normal speech, gait 

not observed. 5/5 strength and preserved sensation to light touch in all 

extremities. 














 Laboratory Results - last 24 hr


 CBC, BMP


 





 03/16/18 07:00 





 03/16/18 07:00 








 03/15/18 06:05 





 03/15/18 06:05 














  03/15/18 03/15/18





  06:05 06:05


 


WBC  15.2 H 


 


RBC  4.11 


 


Hgb  12.5 


 


Hct  37.6 


 


MCV  91.5 


 


MCH  30.4 


 


MCHC  33.2 


 


RDW  12.8 


 


Plt Count  217 


 


MPV  10.0 


 


Neutrophils %  89.2 H 


 


Lymphocytes %  6.3 L D 


 


Monocytes %  4.4  D 


 


Eosinophils %  0.0  D 


 


Basophils %  0.1 


 


Sodium   141


 


Potassium   4.3


 


Chloride   102


 


Carbon Dioxide   29


 


Anion Gap   10


 


BUN   16


 


Creatinine   0.8


 


Creat Clearance w eGFR   > 60


 


Random Glucose   102


 


Calcium   8.7


 


Phosphorus   4.3


 


Magnesium   2.3


 


Total Bilirubin   1.1 H


 


AST   15


 


ALT   34


 


Alkaline Phosphatase   67


 


Total Protein   6.2 L


 


Albumin   3.3 L








Active Medications











Generic Name Dose Route Start Last Admin





  Trade Name Freq  PRN Reason Stop Dose Admin


 


Acetaminophen  650 mg  03/14/18 19:53  03/15/18 12:22





  Tylenol -  PO   650 mg





  Q6H PRN   Administration





  PAIN LEVEL 6-10   


 


Albuterol Sulfate  1 amp  03/14/18 19:53  





  Ventolin 0.083% Nebulizer Soln -  NEB   





  Q4H PRN   





  SHORT OF BREATH/WHEEZING   


 


Albuterol/Ipratropium  1 amp  03/15/18 00:00  03/16/18 04:50





  Duoneb -  NEB   1 amp





  Q4H [facility]   Administration


 


Docusate Sodium  100 mg  03/14/18 22:00  03/15/18 22:15





  Colace -  PO   100 mg





  BID [facility]   Administration


 


Enoxaparin Sodium  40 mg  03/15/18 10:00  03/15/18 10:53





  Lovenox -  SQ   40 mg





  DAILY [facility]   Administration


 


Azithromycin 500 mg/ Dextrose  250 mls @ 250 mls/hr  03/15/18 10:00  03/15/18 10

:53





  IVPB   250 mls/hr





  DAILY [facility]   Administration


 


Ceftriaxone Sodium 1 gm/  50 mls @ 100 mls/hr  03/15/18 08:17  03/15/18 22:15





  Dextrose  IVPB   100 mls/hr





  DAILY@2200 [facility]   Administration


 


Methylprednisolone Sodium Succinate  40 mg  03/14/18 22:00  03/15/18 22:15





  Solu-Medrol -  IVPUSH   40 mg





  BID [facility]   Administration


 


Montelukast Sodium  10 mg  03/14/18 22:00  03/15/18 22:15





  Singulair -  PO   10 mg





  HS [facility]   Administration


 


Polyethylene Glycol  17 gm  03/15/18 10:15  03/15/18 10:54





  Miralax (For Daily Use) -  PO   17 gm





  DAILY [facility]   Administration


 


Senna  1 tab  03/14/18 22:00  03/15/18 22:15





  Senna -  PO   1 tab





  BID [facility]   Administration





 Microbiology





03/12/18 05:20   Urine - Urine Clean Catch   Legionella Antigen - Final


03/12/18 05:20   Urine - Urine Clean Catch   Streptococcus pneumoniae Antigen (

M - Final





CXR 3/11 - Impression: No acute pathology. 





CTA chest 3/12 - 1. No evidence of pulmonary embolism. 2. Left upper lobe 

consolidation/atelectasis. 3. Right middle lobe nodule for which short-term CT 

follow-up is now recommended. Please see above discussion.





CXR 3/13 - Upper lobe atelectasis significantly improved. 





CXR 3/14 - unchanged. No active pumonary dz. 





ASSESSMENT/PLAN:


53 yo F with PMH of asthma (no prior admissions/intubations), who presented to 

ED w/ 4 day hx of cough, SOB and pleuritic chest pain.  Continues to improve, 

WBC normalizing, still w/ cough, chest congestion. 





#Acute hypoxic respiratory failure 2/2 asthma exacerbation vs. PNA - CTA 

negative for PE/pneumothorax; WBC 15.2 -> 10.4 on steroids; Ddimer elevated at 

1345


- Pt switched to PO prednisone, however pulm team recommends cont of IV steroids

; pt remains on IV medrol 40mg BID


- Duonebs prn, singulair


- chest PT w/ deep suctioning


- rocephin/azithro for CAP coverage. Day 6


- trend WBC, fever curve


- BiPap for O2 support overnight. Wean O2 as tolerated. Maintain sat >92%


- IVFs


- IV tylenol for fever, pain control


- Pulm consulted, recs appreciated -> switch to PO prednisone tomorrow





#Constipation - no BM since admission


- Senna, colace; miralax 


- monitor





#PPx:


-lovenox





FEN


PO hydration


lytes wnl


Regular diet





dispo - m/s; dispo in next 24-48 if clinically improved





Plan discussed with attending, Dr. nickolas Bowers, PGY1








Visit type





- Emergency Visit


Emergency Visit: Yes


ED Registration Date: 03/12/18


Care time: The patient presented to the Emergency Department on the above date 

and was hospitalized for further evaluation of their emergent condition.





- New Patient


This patient is new to me today: No





- Critical Care


Critical Care patient: No

## 2018-03-17 LAB
ANION GAP SERPL CALC-SCNC: 5 MMOL/L (ref 8–16)
BASOPHILS # BLD: 0.1 % (ref 0–2)
BUN SERPL-MCNC: 15 MG/DL (ref 7–18)
CALCIUM SERPL-MCNC: 8.7 MG/DL (ref 8.5–10.1)
CHLORIDE SERPL-SCNC: 104 MMOL/L (ref 98–107)
CO2 SERPL-SCNC: 29 MMOL/L (ref 21–32)
CREAT SERPL-MCNC: 0.8 MG/DL (ref 0.55–1.02)
DEPRECATED RDW RBC AUTO: 13 % (ref 11.6–15.6)
EOSINOPHIL # BLD: 0 % (ref 0–4.5)
GLUCOSE SERPL-MCNC: 103 MG/DL (ref 74–106)
HCT VFR BLD CALC: 39.8 % (ref 32.4–45.2)
HGB BLD-MCNC: 13 GM/DL (ref 10.7–15.3)
LYMPHOCYTES # BLD: 6.9 % (ref 8–40)
MCH RBC QN AUTO: 30.2 PG (ref 25.7–33.7)
MCHC RBC AUTO-ENTMCNC: 32.6 G/DL (ref 32–36)
MCV RBC: 92.4 FL (ref 80–96)
MONOCYTES # BLD AUTO: 3.5 % (ref 3.8–10.2)
NEUTROPHILS # BLD: 89.5 % (ref 42.8–82.8)
PLATELET # BLD AUTO: 227 K/MM3 (ref 134–434)
PLATELET BLD QL SMEAR: ADEQUATE
PMV BLD: 9.2 FL (ref 7.5–11.1)
POTASSIUM SERPLBLD-SCNC: 4.4 MMOL/L (ref 3.5–5.1)
RBC # BLD AUTO: 4.31 M/MM3 (ref 3.6–5.2)
SODIUM SERPL-SCNC: 138 MMOL/L (ref 136–145)
WBC # BLD AUTO: 14.6 K/MM3 (ref 4–10)

## 2018-03-17 RX ADMIN — AZITHROMYCIN DIHYDRATE SCH MLS/HR: 500 INJECTION, POWDER, LYOPHILIZED, FOR SOLUTION INTRAVENOUS at 10:45

## 2018-03-17 RX ADMIN — METHYLPREDNISOLONE SODIUM SUCCINATE SCH MG: 40 INJECTION, POWDER, FOR SOLUTION INTRAMUSCULAR; INTRAVENOUS at 01:53

## 2018-03-17 RX ADMIN — IPRATROPIUM BROMIDE AND ALBUTEROL SULFATE SCH: .5; 3 SOLUTION RESPIRATORY (INHALATION) at 04:00

## 2018-03-17 RX ADMIN — IPRATROPIUM BROMIDE AND ALBUTEROL SULFATE SCH AMP: .5; 3 SOLUTION RESPIRATORY (INHALATION) at 08:10

## 2018-03-17 RX ADMIN — IPRATROPIUM BROMIDE AND ALBUTEROL SULFATE SCH AMP: .5; 3 SOLUTION RESPIRATORY (INHALATION) at 11:50

## 2018-03-17 RX ADMIN — IPRATROPIUM BROMIDE AND ALBUTEROL SULFATE SCH AMP: .5; 3 SOLUTION RESPIRATORY (INHALATION) at 20:30

## 2018-03-17 RX ADMIN — SENNOSIDES SCH TAB: 8.6 TABLET, FILM COATED ORAL at 21:22

## 2018-03-17 RX ADMIN — BUDESONIDE AND FORMOTEROL FUMARATE DIHYDRATE SCH PUFF: 160; 4.5 AEROSOL RESPIRATORY (INHALATION) at 21:24

## 2018-03-17 RX ADMIN — DOCUSATE SODIUM SCH MG: 100 CAPSULE, LIQUID FILLED ORAL at 21:22

## 2018-03-17 RX ADMIN — IPRATROPIUM BROMIDE AND ALBUTEROL SULFATE SCH AMP: .5; 3 SOLUTION RESPIRATORY (INHALATION) at 00:00

## 2018-03-17 RX ADMIN — METHYLPREDNISOLONE SODIUM SUCCINATE SCH MG: 40 INJECTION, POWDER, FOR SOLUTION INTRAMUSCULAR; INTRAVENOUS at 10:42

## 2018-03-17 RX ADMIN — DOCUSATE SODIUM SCH MG: 100 CAPSULE, LIQUID FILLED ORAL at 10:42

## 2018-03-17 RX ADMIN — POLYETHYLENE GLYCOL 3350 SCH GM: 17 POWDER, FOR SOLUTION ORAL at 10:45

## 2018-03-17 RX ADMIN — ENOXAPARIN SODIUM SCH MG: 40 INJECTION SUBCUTANEOUS at 10:42

## 2018-03-17 RX ADMIN — MONTELUKAST SODIUM SCH MG: 10 TABLET, COATED ORAL at 21:22

## 2018-03-17 RX ADMIN — SENNOSIDES SCH TAB: 8.6 TABLET, FILM COATED ORAL at 10:42

## 2018-03-17 RX ADMIN — BUDESONIDE AND FORMOTEROL FUMARATE DIHYDRATE SCH PUFF: 160; 4.5 AEROSOL RESPIRATORY (INHALATION) at 17:07

## 2018-03-17 RX ADMIN — IPRATROPIUM BROMIDE AND ALBUTEROL SULFATE SCH AMP: .5; 3 SOLUTION RESPIRATORY (INHALATION) at 16:20

## 2018-03-17 NOTE — PN
Progress Note (short form)





- Note


Progress Note: 


Noted that IV steroids were increased yesterday due to increased wheezing. 


Feels a little better today.  Less wheezing overnight, but still present. 








 Intake & Output











 03/14/18 03/15/18 03/16/18 03/17/18





 23:59 23:59 23:59 23:59


 


Intake Total 1096 900 800 


 


Output Total   500 


 


Balance 1096 900 300 


 


Weight 178 lb 4.8 oz   








 Last Vital Signs











Temp Pulse Resp BP Pulse Ox


 


 98.2 F   81   18   102/55   96 


 


 03/17/18 05:40  03/17/18 05:40  03/17/18 05:40  03/17/18 05:40  03/16/18 20:41








Active Medications





Acetaminophen (Tylenol -)  650 mg PO Q6H PRN


   PRN Reason: PAIN LEVEL 6-10


   Last Admin: 03/16/18 09:44 Dose:  650 mg


Albuterol Sulfate (Ventolin 0.083% Nebulizer Soln -)  1 amp NEB Q4H PRN


   PRN Reason: SHORT OF BREATH/WHEEZING


Albuterol/Ipratropium (Duoneb -)  1 amp NEB Q4H [facility]


   Last Admin: 03/17/18 08:10 Dose:  1 amp


Docusate Sodium (Colace -)  100 mg PO BID [facility]


   Last Admin: 03/17/18 10:42 Dose:  100 mg


Enoxaparin Sodium (Lovenox -)  40 mg SQ DAILY [facility]


   Last Admin: 03/17/18 10:42 Dose:  40 mg


Azithromycin 500 mg/ Dextrose  250 mls @ 250 mls/hr IVPB DAILY [facility]


   Last Admin: 03/17/18 10:45 Dose:  250 mls/hr


Methylprednisolone Sodium Succinate (Solu-Medrol -)  40 mg IVPUSH Q8H-IV [facility]


   Last Admin: 03/17/18 10:42 Dose:  40 mg


Montelukast Sodium (Singulair -)  10 mg PO HS [facility]


   Last Admin: 03/16/18 23:13 Dose:  10 mg


Polyethylene Glycol (Miralax (For Daily Use) -)  17 gm PO DAILY [facility]


   Last Admin: 03/17/18 10:45 Dose:  17 gm


Senna (Senna -)  1 tab PO BID [facility]


   Last Admin: 03/17/18 10:42 Dose:  1 tab








Gen:  NAD at rest


Heart: RRR


Lung: scattered bilateral rhonchi / expiratory wheezes: Right > Left  


Abd: soft, nontender


Ext: no edema





 


 


 Laboratory Results - last 24 hr











  03/16/18 03/17/18 03/17/18





  07:00 07:05 07:05


 


WBC   14.6 H D 


 


RBC   4.31 


 


Hgb   13.0 


 


Hct   39.8 


 


MCV   92.4 


 


MCH   30.2 


 


MCHC   32.6 


 


RDW   13.0 


 


Plt Count   227 


 


MPV   9.2 


 


Neutrophils %   89.5 H 


 


Neutrophils % (Manual)  84.8 H  


 


Band Neutrophils %  0.0  


 


Lymphocytes %   6.9 L 


 


Lymphocytes % (Manual)  5.1 L  


 


Monocytes %   3.5 L 


 


Monocytes % (Manual)  6  


 


Eosinophils %   0.0 


 


Eosinophils % (Manual)  0.0  


 


Basophils %   0.1 


 


Basophils % (Manual)  0.0  


 


Myelocytes % (Man)  2  


 


Promyelocytes % (Man)  0  


 


Blast Cells % (Manual)  0  


 


Nucleated RBC %  0  


 


Metamyelocytes  0  


 


Hypochromia  0  


 


Platelet Estimate  Normal  


 


Polychromasia  0  


 


Poikilocytosis  0  


 


Anisocytosis  1+  


 


Microcytosis  0  


 


Macrocytosis  1+  


 


Sodium    138


 


Potassium    4.4


 


Chloride    104


 


Carbon Dioxide    29


 


Anion Gap    5 L


 


BUN    15


 


Creatinine    0.8


 


Random Glucose    103


 


Calcium    8.7











ASSESSMENT AND PLAN:


Acute Hypoxic Respiratory Failure


Acute Asthma Exacerbation


r/o Pneumonia


r/o Influenza


Atelectasis resolved








-  Would maintain Medrol at current dose 


-  Add Symbicort  


-  inhaled bronchodilators standing and PRN


-  complete empiric antibiotics


-  O2 to keep Spo2 >90%


-  monitor peak flow


-  singulair


-  PO as tolerated


-  DVT prophylaxis


-  Possible D/C in next 24 to 48 hours 





Dr Cheng

## 2018-03-17 NOTE — PN
Teaching Attending Note


Name of Resident: Mairel Frye





ATTENDING PHYSICIAN STATEMENT





I saw and evaluated the patient.


I reviewed the resident's note and discussed the case with the resident.


I agree with the resident's findings and plan as documented with exceptions 

mentioned below.








SUBJECTIVE:


Patient seen and examined, breathing improved, no new complaints. 





OBJECTIVE:


 Vital Signs











 Period  Temp  Pulse  Resp  BP Sys/Herrera  Pulse Ox


 


 Last 24 Hr  97.5 F-98.2 F  81-91  18-20  /52-62  96








 Intake & Output











 03/14/18 03/15/18 03/16/18 03/17/18





 23:59 23:59 23:59 23:59


 


Intake Total 1096 900 800 


 


Output Total   500 


 


Balance 1096 900 300 


 


Weight 178 lb 4.8 oz   








General: sitting in bed in no acute distress, able to talk in full sentences, 

no use of acessory muscles of respiration


Chest: improved air entry, no wheezing appreciated, no rales


Abdomen: soft, NT, ND








 Home Medication List











 Medication  Instructions  Recorded  Confirmed  Type


 


NK [No Known Home Medication]  03/11/18 03/11/18 History








 Active Medications











Generic Name Dose Route Start Last Admin





  Trade Name Freq  PRN Reason Stop Dose Admin


 


Acetaminophen  650 mg  03/14/18 19:53  03/16/18 09:44





  Tylenol -  PO   650 mg





  Q6H PRN   Administration





  PAIN LEVEL 6-10   


 


Albuterol Sulfate  1 amp  03/14/18 19:53  





  Ventolin 0.083% Nebulizer Soln -  NEB   





  Q4H PRN   





  SHORT OF BREATH/WHEEZING   


 


Albuterol/Ipratropium  1 amp  03/15/18 00:00  03/17/18 08:10





  Duoneb -  NEB   1 amp





  Q4H SHUBHAM   Administration


 


Budesonide/Formoterol Fumarate  2 puff  03/17/18 11:15  





  Symbicort 160/4.5mcg -  IH   





  BID SHUBHAM   


 


Docusate Sodium  100 mg  03/14/18 22:00  03/17/18 10:42





  Colace -  PO   100 mg





  BID SHUBHAM   Administration


 


Enoxaparin Sodium  40 mg  03/15/18 10:00  03/17/18 10:42





  Lovenox -  SQ   40 mg





  DAILY SHUBHAM   Administration


 


Azithromycin 500 mg/ Dextrose  250 mls @ 250 mls/hr  03/15/18 10:00  03/17/18 10

:45





  IVPB   250 mls/hr





  DAILY SHUBHAM   Administration


 


Methylprednisolone Sodium Succinate  40 mg  03/17/18 11:15  





  Solu-Medrol -  IVPUSH   





  Q12H SHUBHAM   


 


Montelukast Sodium  10 mg  03/14/18 22:00  03/16/18 23:13





  Singulair -  PO   10 mg





  HS SHUBHAM   Administration


 


Polyethylene Glycol  17 gm  03/15/18 10:15  03/17/18 10:45





  Miralax (For Daily Use) -  PO   17 gm





  DAILY SHUBHAM   Administration


 


Senna  1 tab  03/14/18 22:00  03/17/18 10:42





  Senna -  PO   1 tab





  BID SHUBHAM   Administration








 Laboratory Results - last 24 hr











  03/16/18 03/17/18 03/17/18





  07:00 07:05 07:05


 


WBC   14.6 H D 


 


RBC   4.31 


 


Hgb   13.0 


 


Hct   39.8 


 


MCV   92.4 


 


MCH   30.2 


 


MCHC   32.6 


 


RDW   13.0 


 


Plt Count   227 


 


MPV   9.2 


 


Neutrophils %   89.5 H 


 


Neutrophils % (Manual)  84.8 H  


 


Band Neutrophils %  0.0  


 


Lymphocytes %   6.9 L 


 


Lymphocytes % (Manual)  5.1 L  


 


Monocytes %   3.5 L 


 


Monocytes % (Manual)  6  


 


Eosinophils %   0.0 


 


Eosinophils % (Manual)  0.0  


 


Basophils %   0.1 


 


Basophils % (Manual)  0.0  


 


Myelocytes % (Man)  2  


 


Promyelocytes % (Man)  0  


 


Blast Cells % (Manual)  0  


 


Nucleated RBC %  0  


 


Metamyelocytes  0  


 


Hypochromia  0  


 


Platelet Estimate  Normal  


 


Polychromasia  0  


 


Poikilocytosis  0  


 


Anisocytosis  1+  


 


Microcytosis  0  


 


Macrocytosis  1+  


 


Sodium    138


 


Potassium    4.4


 


Chloride    104


 


Carbon Dioxide    29


 


Anion Gap    5 L


 


BUN    15


 


Creatinine    0.8


 


Random Glucose    103


 


Calcium    8.7








 Microbiology





03/12/18 05:20   Urine - Urine Clean Catch   Legionella Antigen - Final


03/12/18 05:20   Urine - Urine Clean Catch   Streptococcus pneumoniae Antigen (

M - Final











ASSESSMENT AND PLAN:


52 yof with acute asthma exacerbation and SHANTANU PNA vs atelectasis





-Acute asthma exacerbation


-Acute hypoxic respiratory insufficiency


-SHANTANU CAP vs atelectasis





Plan:


Pulmonary input noted, 


s/p 5 days of ceftriaxone/azithromycin, no new fevers or leucocytosis. D/c abx 

and monitor for now. 


IV steroids, taper to q12h and transition to PO in 24 hours if doing well. 


Pre and post ambulatory oxygen needs assessment. 


nebs, taper oxygen as tolerated.


Dispo planning in 24 hours as improves. 


Plan discussed with patient in detail, all questions answered.

## 2018-03-18 VITALS — DIASTOLIC BLOOD PRESSURE: 50 MMHG | TEMPERATURE: 97.9 F | SYSTOLIC BLOOD PRESSURE: 99 MMHG | HEART RATE: 78 BPM

## 2018-03-18 LAB
DEPRECATED RDW RBC AUTO: 12.7 % (ref 11.6–15.6)
HCT VFR BLD CALC: 38.6 % (ref 32.4–45.2)
HGB BLD-MCNC: 12.7 GM/DL (ref 10.7–15.3)
MCH RBC QN AUTO: 30.1 PG (ref 25.7–33.7)
MCHC RBC AUTO-ENTMCNC: 33 G/DL (ref 32–36)
MCV RBC: 91.2 FL (ref 80–96)
PLATELET # BLD AUTO: 227 K/MM3 (ref 134–434)
PLATELET BLD QL SMEAR: NORMAL
PMV BLD: 9.1 FL (ref 7.5–11.1)
RBC # BLD AUTO: 4.24 M/MM3 (ref 3.6–5.2)
WBC # BLD AUTO: 14.8 K/MM3 (ref 4–10)

## 2018-03-18 RX ADMIN — IPRATROPIUM BROMIDE AND ALBUTEROL SULFATE SCH AMP: .5; 3 SOLUTION RESPIRATORY (INHALATION) at 00:15

## 2018-03-18 RX ADMIN — SENNOSIDES SCH TAB: 8.6 TABLET, FILM COATED ORAL at 09:50

## 2018-03-18 RX ADMIN — ENOXAPARIN SODIUM SCH MG: 40 INJECTION SUBCUTANEOUS at 09:50

## 2018-03-18 RX ADMIN — IPRATROPIUM BROMIDE AND ALBUTEROL SULFATE SCH: .5; 3 SOLUTION RESPIRATORY (INHALATION) at 04:00

## 2018-03-18 RX ADMIN — IPRATROPIUM BROMIDE AND ALBUTEROL SULFATE SCH AMP: .5; 3 SOLUTION RESPIRATORY (INHALATION) at 08:05

## 2018-03-18 RX ADMIN — IPRATROPIUM BROMIDE AND ALBUTEROL SULFATE SCH AMP: .5; 3 SOLUTION RESPIRATORY (INHALATION) at 11:40

## 2018-03-18 RX ADMIN — DOCUSATE SODIUM SCH MG: 100 CAPSULE, LIQUID FILLED ORAL at 09:50

## 2018-03-18 RX ADMIN — BUDESONIDE AND FORMOTEROL FUMARATE DIHYDRATE SCH PUFF: 160; 4.5 AEROSOL RESPIRATORY (INHALATION) at 09:50

## 2018-03-18 RX ADMIN — POLYETHYLENE GLYCOL 3350 SCH: 17 POWDER, FOR SOLUTION ORAL at 10:01

## 2018-03-18 NOTE — DS
Physical Exam: 


SUBJECTIVE: Patient seen and examined, breathing markedly improved, no new 

complaints. 








OBJECTIVE:





 Vital Signs











 Period  Temp  Pulse  Resp  BP Sys/Herrera  Pulse Ox


 


 Last 24 Hr  97.9 F-98 F    18-20  /50-68  90-97








PHYSICAL EXAM





General: sitting in bed in no acute distress


CVS;S1S2 regular


Chest: improved air entry, no wheezing appreciated


Abdomen:soft, NT, nD, positive bowel sounds


extremities: no edema.





LABS


 Laboratory Results - last 24 hr











  03/17/18 03/18/18





  07:05 07:20


 


WBC   14.8 H


 


RBC   4.24


 


Hgb   12.7


 


Hct   38.6


 


MCV   91.2


 


MCH   30.1


 


MCHC   33.0


 


RDW   12.7


 


Plt Count   227


 


MPV   9.1


 


Neutrophils %   No Result Required.


 


Neutrophils % (Manual)   79.4


 


Band Neutrophils %   0.0


 


Lymphocytes %   No Result Required.


 


Lymphocytes % (Manual)   8.3  D


 


Monocytes % (Manual)   8


 


Eosinophils % (Manual)   0.0


 


Basophils % (Manual)   0.0


 


Myelocytes % (Man)   0  D


 


Promyelocytes % (Man)   0


 


Blast Cells % (Manual)   0


 


Nucleated RBC %   0


 


Metamyelocytes   0


 


Platelet Estimate  Adequate  Normal


 


Platelet Comment  No clotting detected 





 CTA chest:





 There is good opacification of the central pulmonary vasculature with no 

filling defects 


suspicious for pulmonary embolism. 


 


 Evaluation of the lung fields demonstrates extensive consolidation/atelectasis 

within the left 


upper lobe. This is concerning for an acute pneumonia. Clinical correlation is 

advised. 


 


 The remainder of the lung fields are free of pulmonary masses, areas of acute 

consolidation or 


pleural effusions. There is a 4 mm nodule within the right middle lobe. Short-

term, three-month CT 


follow-up is recommended. 


 


 No mediastinal masses, fluid collections or lymphadenopathy are identified. 

The heart is not 


enlarged and there is no evidence of thoracic aortic aneurysm or dissection. 


 


 Evaluation of the upper abdomen demonstrates no evidence of acute pathology. 


 


 


 IMPRESSION: 


 


 1. No evidence of pulmonary embolism. 


 2. Left upper lobe consolidation/atelectasis. 


 3. Right middle lobe nodule for which short-term CT follow-up is now 

recommended. Please see above 


discussion. 


 


 


 


 


 


 Reported By: Kalyan Myles MD 


 03/12/18 1026 








HOSPITAL COURSE:





Date of Admission:03/12/18





Date of Discharge: 03/18/18











Minutes to complete discharge: 45





Discharge Summary


Reason For Visit: PULMONARY EMBOLISM


Current Active Problems





Pulmonary embolism (Acute)








Hospital Course: 





She was placed on IV solumedrol nebs, chest physiotherapy and IV ceftriaxone 

and azithromycin for suspect Left upper lobe pneumonia. 


She was seen by pulmonary and started on budesonide and singulair 


She finished 5 days of antibiotics with no fevers or worsening symptoms. She 

will be discharged on slow prednisone taper. 


Her respiratory status continued to improve and she is oxygenating well on room 

air prior to discharge.


She was found with 4 mm lung nodule, findings of which have been discussed with 

patient and she will need outpatient follow up CT chest. 





Condition: Fair





- Instructions


Diet, Activity, Other Instructions: 


During your stay at [facility], you were treated for a suspected asthma attack and 

pneumonia. You received treatment to help improve your breathing and your 

shortness of breath has been steadily improving. 





Medications:


The following medications were added to your regimen. Please take them as 

described below:





Prednisone 60 mg daily for 3 days starting tomorrow (3/19, 3/20 and 3/21)


50 mg daily on 3/22, 3/23, 3/24


40 mg daily on 3/25, 3/26, 3/27


30 mg daily on 3/28, 3/29, 3/30


20 mg daily on 3/31, 4/1, 4/2


10 mg daily on 4/3, 4/4, 4/5





Symbicort 160/4.5mg. Please take 2 puffs, two times a day with inhaler





Singulair 10mg. Please take one pill by mouth, once a day before bedtime





Albuterol inhaler. Please take one puff every four hours if you feel short of 

breath until your symptoms resolve.











Please continue to take all other home medications as previously directed.





Follow-ups:





Please follow-up with your primary care physician, Dr. Sow, in one week for 

further management of your medications. Please call their office to schedule an 

appointment. 





Please follow-up with our pulmonologist, Dr. Flores, in one week for further 

management and treatment of your asthma and pneumonia. His contact number has 

been provided in this packet. Please call his office to make an appointment.  





Right middle lobe lung nodule 4 mm, you will need follow up CT chest with your 

doctor in a few months, please discuss with your lung doctor. The findings have 

been discussed with your. 





recommend deep breathing exercises and incentive spirometry as tolerated as 

directed.  





Diet/exercise:


Please avoid exposure to pet dander, cold weather, pollen, dust or any other 

allergens that could trigger an asthma attack.





Please return to the hospital if you experience any of the following symptoms:


- Prolonged shortness of breath


- Worsening productive cough


- Persistent fevers/chills


- Worsening wheezing or chest tightness


- Any new or concerning symptoms


Referrals: 


Hiram Sow [Primary Care Provider] - 1 Week


Disposition: HOME





- Home Medications


Comprehensive Discharge Medication List: 


Ambulatory Orders





Albuterol Sulfate Inhaler - [Ventolin HFA Inhaler -] 1 - 2 inh PO Q4H PRN #1 

inhaler 03/18/18 


Budesonide/Formeterol Fumarate [SYMBICORT 160/4.5mcg -] 2 puff IH BID #1 

inhaler 03/18/18 


Montelukast Na [Singulair -] 10 mg PO HS #30 tablet 03/18/18 


Prednisone 10 mg PO ASDIR 18 Days #65 tablet 03/18/18 








This patient is new to me today: No


Emergency Visit: No


Critical Care patient: No





- Discharge Referral


Referred to [facility] No

## 2018-03-18 NOTE — PN
Progress Note (short form)





- Note


Progress Note: 


Appears overall better today.  No wheezing. 


Breathing feels better. 


Noted change to Prednisone. 


 Intake & Output











 03/15/18 03/16/18 03/17/18 03/18/18





 23:59 23:59 23:59 23:59


 


Intake Total  100


 


Output Total  500  


 


Balance  100








 Last Vital Signs











Temp Pulse Resp BP Pulse Ox


 


 97.9 F   78   19   99/50   97 


 


 03/18/18 10:00  03/18/18 10:00  03/18/18 10:00  03/18/18 10:00  03/18/18 09:00








Active Medications





Acetaminophen (Tylenol -)  650 mg PO Q6H PRN


   PRN Reason: PAIN LEVEL 6-10


   Last Admin: 03/16/18 09:44 Dose:  650 mg


Albuterol Sulfate (Ventolin 0.083% Nebulizer Soln -)  1 amp NEB Q4H PRN


   PRN Reason: SHORT OF BREATH/WHEEZING


Albuterol/Ipratropium (Duoneb -)  1 amp NEB Q4H [facility]


   Last Admin: 03/18/18 08:05 Dose:  1 amp


Budesonide/Formoterol Fumarate (Symbicort 160/4.5mcg -)  2 puff IH BID [facility]


   Last Admin: 03/18/18 09:50 Dose:  2 puff


Docusate Sodium (Colace -)  100 mg PO BID [facility]


   Last Admin: 03/18/18 09:50 Dose:  100 mg


Enoxaparin Sodium (Lovenox -)  40 mg SQ DAILY [facility]


   Last Admin: 03/18/18 09:50 Dose:  40 mg


Montelukast Sodium (Singulair -)  10 mg PO HS [facility]


   Last Admin: 03/17/18 21:22 Dose:  10 mg


Polyethylene Glycol (Miralax (For Daily Use) -)  17 gm PO DAILY [facility]


   Last Admin: 03/18/18 10:01 Dose:  Not Given


Prednisone (Deltasone -)  60 mg PO DAILY [facility]


   Last Admin: 03/18/18 09:50 Dose:  60 mg


Senna (Senna -)  1 tab PO BID [facility]


   Last Admin: 03/18/18 09:50 Dose:  1 tab





Gen:  NAD at rest


Heart: RRR


Lung: few scattered bilateral rhonchi, no expiratory wheezes   


Abd: soft, nontender


Ext: no edema





 


 


 


 Laboratory Results - last 24 hr











  03/17/18 03/18/18





  07:05 07:20


 


WBC   14.8 H


 


RBC   4.24


 


Hgb   12.7


 


Hct   38.6


 


MCV   91.2


 


MCH   30.1


 


MCHC   33.0


 


RDW   12.7


 


Plt Count   227


 


MPV   9.1


 


Neutrophils %   No Result Required.


 


Neutrophils % (Manual)   79.4


 


Band Neutrophils %   0.0


 


Lymphocytes %   No Result Required.


 


Lymphocytes % (Manual)   8.3  D


 


Monocytes % (Manual)   8


 


Eosinophils % (Manual)   0.0


 


Basophils % (Manual)   0.0


 


Myelocytes % (Man)   0  D


 


Promyelocytes % (Man)   0


 


Blast Cells % (Manual)   0


 


Nucleated RBC %   0


 


Metamyelocytes   0


 


Platelet Estimate  Adequate  Normal


 


Platelet Comment  No clotting detected 














ASSESSMENT AND PLAN:


Acute Hypoxic Respiratory Failure


Acute Asthma Exacerbation


r/o Pneumonia


r/o Influenza


Atelectasis resolved








-  Agree with Prednisone  


-  Symbicort BID   


-  inhaled bronchodilators


-  O2 to keep Spo2 >90%


-  monitor peak flow


-  singulair


-  PO as tolerated


-  DVT prophylaxis


-  D/C planning: Maintain on Symbicort/Ventolin 





Dr Cheng